# Patient Record
Sex: FEMALE | Race: BLACK OR AFRICAN AMERICAN | Employment: FULL TIME | URBAN - METROPOLITAN AREA
[De-identification: names, ages, dates, MRNs, and addresses within clinical notes are randomized per-mention and may not be internally consistent; named-entity substitution may affect disease eponyms.]

---

## 2020-12-12 LAB — CREATININE, EXTERNAL: 0.92

## 2020-12-18 ENCOUNTER — TRANSCRIBE ORDER (OUTPATIENT)
Dept: SCHEDULING | Age: 40
End: 2020-12-18

## 2020-12-18 DIAGNOSIS — D34 THYROID ADENOMA: Primary | ICD-10-CM

## 2021-01-07 ENCOUNTER — TRANSCRIBE ORDER (OUTPATIENT)
Dept: SCHEDULING | Age: 41
End: 2021-01-07

## 2021-01-07 DIAGNOSIS — D34 THYROID ADENOMA: Primary | ICD-10-CM

## 2021-01-13 ENCOUNTER — HOSPITAL ENCOUNTER (OUTPATIENT)
Dept: CT IMAGING | Age: 41
Discharge: HOME | End: 2021-01-13
Attending: OTOLARYNGOLOGY
Payer: COMMERCIAL

## 2021-01-13 DIAGNOSIS — D34 THYROID ADENOMA: ICD-10-CM

## 2021-01-13 PROCEDURE — 70490 CT SOFT TISSUE NECK W/O DYE: CPT

## 2021-03-05 ENCOUNTER — VIRTUAL VISIT (OUTPATIENT)
Dept: ENDOCRINOLOGY | Age: 41
End: 2021-03-05
Payer: COMMERCIAL

## 2021-03-05 DIAGNOSIS — E05.00 GRAVES' DISEASE WITH EXOPHTHALMOS: ICD-10-CM

## 2021-03-05 DIAGNOSIS — E89.0 POSTOPERATIVE HYPOTHYROIDISM: ICD-10-CM

## 2021-03-05 DIAGNOSIS — E05.90 HYPERTHYROIDISM: Primary | ICD-10-CM

## 2021-03-05 PROCEDURE — 99245 OFF/OP CONSLTJ NEW/EST HI 55: CPT | Performed by: INTERNAL MEDICINE

## 2021-03-05 RX ORDER — METHIMAZOLE 5 MG/1
TABLET ORAL
COMMUNITY
Start: 2020-12-30 | End: 2021-05-15

## 2021-03-05 NOTE — PROGRESS NOTES
Chief Complaint   Patient presents with    Thyroid Problem     pcp and pharmacy. DOXY. ME            **THIS IS A VIRTUAL VISIT VIA A VIDEO ENCOUNTER. PATIENT AGREED TO HAVE THEIR CARE DELIVERED OVER VIDEO IN PLACE OF THEIR REGULARLY SCHEDULED OFFICE VISIT**      History of Present Illness: Tessa Fermin is a 41 y.o. female who I was asked to see in consult by Dr. Demetrice Carrera for evaluation of hyperthyroidism. Pt was first diagnosed with hyperthyroidism around 2011. She notes that she has been on the [address] [place] [address] since that time. \"I was diagnosed by an endocrinologist, I don't recall who that was. I then started to go to my PCP who has been evaluating my TFTs and adjusting my MMI. Pt notes that I went to see an allergist and they referred her to Dr. Judy Cerda, who send her for a CT which showed the goiter and the orbitopathy. Her most recent TSH was 6.02 in December 2020 with FT4 of 0.92. She is currently taking MMI 10mg Mon and Wed and 5mg T/T/F/S/S. She has been on this dose for a couple of month. She notes her dose was reduced after the December TFTs. Pt notes she is having issues of dysphagia \"from time to time\". She denies issues of dysphagia, dysphonia, tremors, palpitations. She denies issues of heat or cold intolerance, diarrhea or constipation. \"When things get bad, I get fatigue and very tired\". She denies issues of eye pain, eye irritation or vision changes. She does report that \"I will feel eye pressure\". She will be having total thyroidectomy on 3/19/21 by Dr. Demetrice Carrera. She saw ophthalmology last week. She is seeing Dr. Heidi Phillips will be monitoring my pressure after the thyroidectomy\". Will request records from Dr. Adeola Chavira. No known family hx of thyroid issues. Pt reports that her weight is currently 70.3kg (155 pounds).     Past Medical History:   Diagnosis Date    Graves disease      Past Surgical History:   Procedure Laterality Date    HX TUBAL LIGATION      HX WISDOM TEETH EXTRACTION       Current Outpatient Medications   Medication Sig   • methIMAzole (TAPAZOLE) 5 mg tablet TAKE 2 TABLETS BY MOUTH DAILY ON MON/WED, THEN 1 TABLET DAILY ON TUES/THURS/FRI/SAT/SUN   • cholecalciferol, vitamin D3, (VITAMIN D3 PO) Take 5,000 Units by mouth daily.   • ZINC PO Take 50 mg by mouth daily.     No current facility-administered medications for this visit.      No Known Allergies  Family History   Problem Relation Age of Onset   • No Known Problems Mother    • No Known Problems Father    • No Known Problems Maternal Grandmother    • No Known Problems Maternal Grandfather         mva   • No Known Problems Paternal Grandmother    • Cancer Paternal Grandfather         ? type (colon or prostate)   • Breast Cancer Maternal Aunt    • Cancer Maternal Aunt         skin   • No Known Problems Half-sister    • No Known Problems Half-sister    • No Known Problems Half-sister    • No Known Problems Half-sister    • No Known Problems Half-sister    • No Known Problems Half-brother      Social History     Socioeconomic History   • Marital status:      Spouse name: Not on file   • Number of children: Not on file   • Years of education: Not on file   • Highest education level: Not on file   Occupational History   • Not on file   Social Needs   • Financial resource strain: Not on file   • Food insecurity     Worry: Not on file     Inability: Not on file   • Transportation needs     Medical: Not on file     Non-medical: Not on file   Tobacco Use   • Smoking status: Former Smoker   • Smokeless tobacco: Former User   • Tobacco comment: Recently quit vapping. (February 2021)   Substance and Sexual Activity   • Alcohol use: Yes     Comment: occasionally   • Drug use: Never   • Sexual activity: Not on file   Lifestyle   • Physical activity     Days per week: Not on file     Minutes per session: Not on file   • Stress: Not on file   Relationships   • Social connections     Talks on phone: Not on file     Gets  together: Not on file     Attends [employer] service: Not on file     Active member of club or organization: Not on file     Attends meetings of clubs or organizations: Not on file     Relationship status: Not on file    Intimate partner violence     Fear of current or ex partner: Not on file     Emotionally abused: Not on file     Physically abused: Not on file     Forced sexual activity: Not on file   Other Topics Concern    Not on file   Social History Narrative    Not on file     Review of Systems:  - Constitutional Symptoms: no fevers, chills, weight loss  - Eyes: no blurry vision or double vision  - Cardiovascular: no chest pain or palpitations  - Respiratory: no cough or shortness of breath  - Gastrointestinal: no dysphagia or abdominal pain  - Musculoskeletal: no joint pains or weakness  - Integumentary: no rashes  - Neurological: no numbness, tingling, or headaches  - Psychiatric: no depression or anxiety  - Endocrine: no heat or cold intolerance, no polyuria or polydipsia    Physical Examination:  There were no vitals taken for this visit. - GENERAL: NCAT, Appears well nourished   - EYES: EOMI, non-icteric, + proptosis   - Ear/Nose/Throat: NCAT, + goiter   - CARDIOVASCULAR: no cyanosis, no visible JVD   - RESPIRATORY: respiratory effort normal without any distress or labored breathing   - MUSCULOSKELETAL: Normal ROM of neck and upper extremities observed   - SKIN: No rash on face   - NEUROLOGIC:  No facial asymmetry (Cranial nerve 7 motor function), No gaze palsy   - PSYCHIATRIC: Normal affect, Normal insight and judgement     Data Reviewed:   - TSH 6.02  - Free T4 0.92    EXAM:  CT NECK SOFT TISSUE WO CONT     INDICATION:  Thyroid adenoma.     COMPARISON: None.     CONTRAST: None.     TECHNIQUE: Multislice helical CT was performed from the mid calvarium to the  aortic arch without intravenous contrast administration.  Contiguous 2.5 mm axial  images were reconstructed and lung and soft tissue windows were generated. Coronal and sagittal reformations were generated. CT dose reduction was  achieved through use of a standardized protocol tailored for this examination  and automatic exposure control for dose modulation.      FINDINGS:  There is diffuse thyromegaly, with the right thyroid lobe measuring  approximately 7.4 x 3.4 x 3.0 cm, and the left thyroid lobe measuring  approximately 6.8 x 3.4 x 3.0 cm. No discrete thyroid nodule is identified on  this noncontrast examination. There is minimal narrowing of the trachea in  transverse dimension. There are no enlarged cervical lymph nodes. The  aerodigestive tract is unremarkable. The parotid and submandibular glands are  normal.     There is bilateral proptosis with abundant intraorbital fat and stretching of  the optic nerves. There is mild enlargement of the bilateral inferior and medial  rectus muscles, left larger than right.     Visualized brain parenchyma is normal in appearance. Paranasal sinuses and  mastoid air cells are clear. No acute fracture or aggressive osseous lesion. Visualized portions of the lung apices are normal.     IMPRESSION  IMPRESSION:   1. Diffuse thyromegaly, most suggestive of Graves' disease. No discrete thyroid  nodule is identified on this noncontrast examination. Minimal narrowing of the  trachea in transverse dimension. 2. Bilateral proptosis with abundant intraorbital fat and stretching of the  optic nerves. Mild enlargement of the bilateral inferior and medial rectus  muscles, left larger than right. 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 Assessment/Plan:   1. Hyperthyroidism    2. Graves' disease with exophthalmos    3. Postoperative hypothyroidism    1) Pt is going for total thyroidectomy on 3/19/21. Her current weight is 70kg. I recommend that after surgery she be started on LT4 112mcg daily.    We discussed how the Grave's autoantibodies effect the thyroid gland causing both hyperthyroidism and thyromegaly. We also discussed the grave's opthalmopathy and how thyroidectomy can potentially lower the grave's antibody burden by removing the thyroid and there by removing the immuno-stimulation. Will see pt back 6 weeks after surgery and will order TFTs to address her, then post-op hypothyroidism. We discussed that the surgery does not stop the production of the Grave's antibodies so that she will continue to have \"grave's disease with post-surgical hypothyroidism\"    Pt voices understanding and agreement with the plan. RTC 4/28/21      Copy sent to:  Marly Yung and Vince Lazo

## 2021-03-08 ENCOUNTER — HOSPITAL ENCOUNTER (OUTPATIENT)
Dept: PREADMISSION TESTING | Age: 41
Discharge: HOME | End: 2021-03-08
Payer: COMMERCIAL

## 2021-03-08 VITALS
HEIGHT: 60 IN | BODY MASS INDEX: 31.16 KG/M2 | WEIGHT: 158.73 LBS | DIASTOLIC BLOOD PRESSURE: 84 MMHG | SYSTOLIC BLOOD PRESSURE: 124 MMHG | TEMPERATURE: 98.9 F | HEART RATE: 78 BPM

## 2021-03-08 LAB
ALBUMIN SERPL-MCNC: 3.8 G/DL (ref 3.5–5)
ALBUMIN/GLOB SERPL: 1.1 {RATIO} (ref 1.1–2.2)
ALP SERPL-CCNC: 72 U/L (ref 45–117)
ALT SERPL-CCNC: 16 U/L (ref 12–78)
ANION GAP SERPL CALC-SCNC: 4 MMOL/L (ref 5–15)
APTT PPP: 28.5 SEC (ref 22.1–31)
AST SERPL-CCNC: 9 U/L (ref 15–37)
ATRIAL RATE: 65 BPM
BASOPHILS # BLD: 0 K/UL (ref 0–0.1)
BASOPHILS NFR BLD: 0 % (ref 0–1)
BILIRUB SERPL-MCNC: 0.3 MG/DL (ref 0.2–1)
BUN SERPL-MCNC: 14 MG/DL (ref 6–20)
BUN/CREAT SERPL: 18 (ref 12–20)
CALCIUM SERPL-MCNC: 8.6 MG/DL (ref 8.5–10.1)
CALCULATED P AXIS, ECG09: 40 DEGREES
CALCULATED R AXIS, ECG10: 25 DEGREES
CALCULATED T AXIS, ECG11: 16 DEGREES
CHLORIDE SERPL-SCNC: 106 MMOL/L (ref 97–108)
CO2 SERPL-SCNC: 28 MMOL/L (ref 21–32)
CREAT SERPL-MCNC: 0.78 MG/DL (ref 0.55–1.02)
DIAGNOSIS, 93000: NORMAL
DIFFERENTIAL METHOD BLD: NORMAL
EOSINOPHIL # BLD: 0.1 K/UL (ref 0–0.4)
EOSINOPHIL NFR BLD: 2 % (ref 0–7)
ERYTHROCYTE [DISTWIDTH] IN BLOOD BY AUTOMATED COUNT: 13.2 % (ref 11.5–14.5)
GLOBULIN SER CALC-MCNC: 3.4 G/DL (ref 2–4)
GLUCOSE SERPL-MCNC: 83 MG/DL (ref 65–100)
HCT VFR BLD AUTO: 36.4 % (ref 35–47)
HGB BLD-MCNC: 11.9 G/DL (ref 11.5–16)
IMM GRANULOCYTES # BLD AUTO: 0 K/UL (ref 0–0.04)
IMM GRANULOCYTES NFR BLD AUTO: 0 % (ref 0–0.5)
INR PPP: 1.1 (ref 0.9–1.1)
LYMPHOCYTES # BLD: 1.5 K/UL (ref 0.8–3.5)
LYMPHOCYTES NFR BLD: 31 % (ref 12–49)
MCH RBC QN AUTO: 30.7 PG (ref 26–34)
MCHC RBC AUTO-ENTMCNC: 32.7 G/DL (ref 30–36.5)
MCV RBC AUTO: 93.8 FL (ref 80–99)
MONOCYTES # BLD: 0.4 K/UL (ref 0–1)
MONOCYTES NFR BLD: 8 % (ref 5–13)
NEUTS SEG # BLD: 2.9 K/UL (ref 1.8–8)
NEUTS SEG NFR BLD: 59 % (ref 32–75)
NRBC # BLD: 0 K/UL (ref 0–0.01)
NRBC BLD-RTO: 0 PER 100 WBC
P-R INTERVAL, ECG05: 138 MS
PLATELET # BLD AUTO: 328 K/UL (ref 150–400)
PMV BLD AUTO: 9.5 FL (ref 8.9–12.9)
POTASSIUM SERPL-SCNC: 3.9 MMOL/L (ref 3.5–5.1)
PROT SERPL-MCNC: 7.2 G/DL (ref 6.4–8.2)
PROTHROMBIN TIME: 11.3 SEC (ref 9–11.1)
Q-T INTERVAL, ECG07: 358 MS
QRS DURATION, ECG06: 76 MS
QTC CALCULATION (BEZET), ECG08: 372 MS
RBC # BLD AUTO: 3.88 M/UL (ref 3.8–5.2)
SODIUM SERPL-SCNC: 138 MMOL/L (ref 136–145)
T4 FREE SERPL-MCNC: 0.7 NG/DL (ref 0.8–1.5)
THERAPEUTIC RANGE,PTTT: NORMAL SECS (ref 58–77)
TSH SERPL DL<=0.05 MIU/L-ACNC: 3.5 UIU/ML (ref 0.36–3.74)
VENTRICULAR RATE, ECG03: 65 BPM
WBC # BLD AUTO: 4.9 K/UL (ref 3.6–11)

## 2021-03-08 PROCEDURE — 85730 THROMBOPLASTIN TIME PARTIAL: CPT

## 2021-03-08 PROCEDURE — 93005 ELECTROCARDIOGRAM TRACING: CPT

## 2021-03-08 PROCEDURE — 85025 COMPLETE CBC W/AUTO DIFF WBC: CPT

## 2021-03-08 PROCEDURE — 80053 COMPREHEN METABOLIC PANEL: CPT

## 2021-03-08 PROCEDURE — 36415 COLL VENOUS BLD VENIPUNCTURE: CPT

## 2021-03-08 PROCEDURE — 85610 PROTHROMBIN TIME: CPT

## 2021-03-08 PROCEDURE — 84439 ASSAY OF FREE THYROXINE: CPT

## 2021-03-08 PROCEDURE — 84443 ASSAY THYROID STIM HORMONE: CPT

## 2021-03-08 NOTE — PERIOP NOTES
PREOPERATIVE INSTRUCTIONS REVIEWED WITH PATIENT. PATIENT GIVEN TWO-- BOTTLES OF CHG SOAPS  INSTRUCTIONS REVIEWED ON USE OF CHG SOAPS   PATIENT GIVEN SSI INFECTIONS SHEET. PATIENT WAS GIVEN THE OPPORTUNITY TO ASK QUESTIONS ON THE INFORMATION PROVIDED. PT  MADE AWARE OF COVID 19 TESTING NEEDED TO BE DONE WITHIN 96 HOURS OF SURGERY. PT INSTRUCTED ON SELF QUARANTINE  BETWEEN TESTING AND ARRIVAL TIME  ON DAY OF SURGERY. INSTRUCTION PROVIDED FOR CELL PHONE WAITING AREA, PT INFORMATION AND INSTRUCTIONS FOR APPOINTMENTS AT [facility] ON DAY OF SURGERY.

## 2021-03-09 ENCOUNTER — ANESTHESIA EVENT (OUTPATIENT)
Dept: SURGERY | Age: 41
End: 2021-03-09
Payer: COMMERCIAL

## 2021-03-09 NOTE — PERIOP NOTES
EUGENIO IN DR. Yury Trujillo OFFICE NOTIFIED OF THE FOLLOWING:    PT-11.3 (H)    PCP FAXED RESULT VIA [facility]. ALL LABS AND EKG FAXED TO DR. Yury Trujillo OFFICE WITH CONFIRMATION RECEIPT RECEIVED.

## 2021-03-19 ENCOUNTER — ANESTHESIA (OUTPATIENT)
Dept: SURGERY | Age: 41
End: 2021-03-19
Payer: COMMERCIAL

## 2021-04-16 ENCOUNTER — VIRTUAL VISIT (OUTPATIENT)
Dept: ONCOLOGY | Age: 41
End: 2021-04-16
Payer: COMMERCIAL

## 2021-04-16 DIAGNOSIS — E03.9 HYPOTHYROIDISM, UNSPECIFIED TYPE: ICD-10-CM

## 2021-04-16 DIAGNOSIS — R58 BLEEDING: Primary | ICD-10-CM

## 2021-04-16 DIAGNOSIS — I10 ESSENTIAL HYPERTENSION: ICD-10-CM

## 2021-04-16 PROCEDURE — 99204 OFFICE O/P NEW MOD 45 MIN: CPT | Performed by: INTERNAL MEDICINE

## 2021-04-16 NOTE — PROGRESS NOTES
[facility] [place] at [address] [place] [address] [facility]  W: [phone]  F: [phone]    Reason for Visit:   Nimisha Lynne is a 41 y.o. female who is seen in consultation at the request of Dr. Erica Gray  for evaluation of bleeding disorder    History of Present Illness:   Patient is a 41 y.o. female seen for possible bleeding diathesis. She is to have a thyroidectomy with Dr. Erica Gray, was noted to have an PT of 11.3 in her pre op testing. She has had 2 normal pregnancies, she has had wisdom tooth extraction without bleeding,  She has had no fevers, chills, sweats, no FH of bleeding.  No liver disease    She rarely uses alcohol    Past Medical History:   Diagnosis Date    Goiter     Graves disease       Past Surgical History:   Procedure Laterality Date    HX HEENT      WISDOM TEETH EXTRACTION     HX TUBAL LIGATION      HX WISDOM TEETH EXTRACTION        Social History     Tobacco Use    Smoking status: Former Smoker     Packs/day: 0.50     Years: 10.00     Pack years: 5.00     Quit date:      Years since quittin.2    Smokeless tobacco: Former User   Substance Use Topics    Alcohol use: Yes     Comment: occasionally      Family History   Problem Relation Age of Onset    No Known Problems Mother     No Known Problems Father     No Known Problems Maternal Grandmother     No Known Problems Maternal Grandfather         mva    No Known Problems Paternal Grandmother     Cancer Paternal Grandfather         ? type (colon or prostate)    Breast Cancer Maternal Aunt     Cancer Maternal Aunt         skin    No Known Problems Half-sister     No Known Problems Half-sister     No Known Problems Half-sister     No Known Problems Half-sister     No Known Problems Half-sister     No Known Problems Half-brother     Anesth Problems Neg Hx      Current Outpatient Medications   Medication Sig    methIMAzole (TAPAZOLE) 5 mg tablet TAKE 2 TABLETS BY MOUTH DAILY ON MON/WED, THEN 1 TABLET DAILY ON TUES/THURS/FRI/SAT/SUN    cholecalciferol, vitamin D3, (VITAMIN D3 PO) Take 5,000 Units by mouth daily.  ZINC PO Take 50 mg by mouth daily. No current facility-administered medications for this visit. Allergies   Allergen Reactions    Grass Pollen-Perennial Rye, Standard Hives    Shellfish Derived Hives        Review of Systems: A complete review of systems was obtained, negative except as described above. Physical Exam:     Due to this being a TeleHealth evaluation, many elements of the physical examination are unable to be assessed. Constitutional: Appears well-developed and well-nourished in no apparent distress   Mental status: Alert and awake, Oriented to person/place/time, Able to follow commands  Eyes: EOM normal, Sclera normal, No visible ocular discharge  HENT: Normocephalic, atraumatic; Mouth/Throat: Moist mucous membranes, External Ears normal  Skin: No significant exanthematous lesions or discoloration noted on facial skin  Psychiatric: Normal affect, normal judgment/insight. No hallucinations       Results:     Lab Results   Component Value Date/Time    WBC 4.9 03/08/2021 02:20 PM    HGB 11.9 03/08/2021 02:20 PM    HCT 36.4 03/08/2021 02:20 PM    PLATELET 252 85/61/4732 02:20 PM    MCV 93.8 03/08/2021 02:20 PM    ABS. NEUTROPHILS 2.9 03/08/2021 02:20 PM     Lab Results   Component Value Date/Time    Sodium 138 03/08/2021 02:20 PM    Potassium 3.9 03/08/2021 02:20 PM    Chloride 106 03/08/2021 02:20 PM    CO2 28 03/08/2021 02:20 PM    Glucose 83 03/08/2021 02:20 PM    BUN 14 03/08/2021 02:20 PM    Creatinine 0.78 03/08/2021 02:20 PM    GFR est AA >60 03/08/2021 02:20 PM    GFR est non-AA >60 03/08/2021 02:20 PM    Calcium 8.6 03/08/2021 02:20 PM     Lab Results   Component Value Date/Time    Bilirubin, total 0.3 03/08/2021 02:20 PM    ALT (SGPT) 16 03/08/2021 02:20 PM    Alk.  phosphatase 72 03/08/2021 02:20 PM    Protein, total 7.2 03/08/2021 02:20 PM    Albumin 3.8 03/08/2021 02:20 PM    Globulin 3.4 03/08/2021 02:20 PM       Lab Results   Component Value Date/Time    TSH 3.50 03/08/2021 02:20 PM     Lab Results   Component Value Date/Time    INR 1.1 03/08/2021 02:20 PM    aPTT 28.5 03/08/2021 02:20 PM       Records reviewed and summarized above. Pathology report(s) reviewed above. Radiology report(s) reviewed above. Assessment:   1) Abnormal PT    11.3  No H/O Bleeding / FH of bleeding  Meds are non contributory though methimazole may cause hypoprothrombinemia  Likely not clinically significant  See work up below    If work up unremarkable - Hold Methimazole for atleast 24-48 hours prior to surgery    2) Graves disease    3) Obesity      Plan:     · PT, PT Mixing study, VII activity  · Hold Methimazole 24-48 hours prior to surgery  · RTC 2 weeks VV to review results    I appreciate the opportunity to participate in Ms. Deo Arechiga's care. Signed By: Robert Clifford MD      The patient was evaluated through a synchronous (real-time) audio-video encounter. The patient (or guardian if applicable) is aware that this is a billable service. Verbal consent to proceed has been obtained within the past 12 months. The visit was conducted pursuant to the emergency declaration under the [address] [place] [address], [address] [place] [address] authority and the [employer] General Act. Patient identification was verified, and a caregiver was present when appropriate. The patient was located in a state where the provider was credentialed to provide care.

## 2021-04-16 NOTE — PROGRESS NOTES
Jaqueline Neely is a 41 y.o. female    No chief complaint on file. 1. Have you been to the ER, urgent care clinic since your last visit? Hospitalized since your last visit? No    2. Have you seen or consulted any other health care providers outside of the [address] [place] [address] since your last visit? Include any pap smears or colon screening.  No

## 2021-05-03 LAB
ALBUMIN SERPL-MCNC: 4.5 G/DL (ref 3.8–4.8)
ALBUMIN/GLOB SERPL: 1.8 {RATIO} (ref 1.2–2.2)
ALP SERPL-CCNC: 73 IU/L (ref 39–117)
ALT SERPL-CCNC: 6 IU/L (ref 0–32)
AST SERPL-CCNC: 12 IU/L (ref 0–40)
BILIRUB SERPL-MCNC: 0.3 MG/DL (ref 0–1.2)
BUN SERPL-MCNC: 9 MG/DL (ref 6–24)
BUN/CREAT SERPL: 12 (ref 9–23)
CALCIUM SERPL-MCNC: 9.2 MG/DL (ref 8.7–10.2)
CHLORIDE SERPL-SCNC: 104 MMOL/L (ref 96–106)
CO2 SERPL-SCNC: 22 MMOL/L (ref 20–29)
CREAT SERPL-MCNC: 0.73 MG/DL (ref 0.57–1)
FACT VII ACT/NOR PPP: 75 % (ref 51–186)
GLOBULIN SER CALC-MCNC: 2.5 G/DL (ref 1.5–4.5)
GLUCOSE SERPL-MCNC: 92 MG/DL (ref 65–99)
POTASSIUM SERPL-SCNC: 4.3 MMOL/L (ref 3.5–5.2)
PROT SERPL-MCNC: 7 G/DL (ref 6–8.5)
PROTHROMBIN TIME: 10.6 SEC (ref 9.1–12)
PT 1H NP PPP: NORMAL SEC
PT IMM NP PPP: 10.2 SEC (ref 9.1–12)
SODIUM SERPL-SCNC: 139 MMOL/L (ref 134–144)

## 2021-05-06 ENCOUNTER — VIRTUAL VISIT (OUTPATIENT)
Dept: ONCOLOGY | Age: 41
End: 2021-05-06
Payer: COMMERCIAL

## 2021-05-06 DIAGNOSIS — R58 BLEEDING: Primary | ICD-10-CM

## 2021-05-06 PROCEDURE — 99213 OFFICE O/P EST LOW 20 MIN: CPT | Performed by: INTERNAL MEDICINE

## 2021-05-06 RX ORDER — POTASSIUM IODIDE 1 G/ML
0.1 SOLUTION ORAL 3 TIMES DAILY
COMMUNITY
Start: 2021-03-23 | End: 2021-05-15

## 2021-05-06 NOTE — PROGRESS NOTES
[facility] [place] at [place] [address] [address]  W: [phone]  F: [phone]    Reason for Visit:   Bhanu Hansen is a 35 y.o. female who is seen as a follow up to r/o a bleeding disorder    Seen by synchronous (real-time) audio-video technology on 2021     History of Present Illness:   Patient is a 35 y.o. female seen for possible bleeding diathesis. She is to have a thyroidectomy with Dr. Taye Davis, was noted to have an PT of 11.3 in her pre op testing. She has had 2 normal pregnancies, she has had wisdom tooth extraction without bleeding,  She has had no fevers, chills, sweats, no FH of bleeding.  No liver disease  Seen for review of additional testing  She rarely uses alcohol    Past Medical History:   Diagnosis Date    Goiter     Graves disease       Past Surgical History:   Procedure Laterality Date    HX HEENT      WISDOM TEETH EXTRACTION     HX TUBAL LIGATION      HX WISDOM TEETH EXTRACTION        Social History     Tobacco Use    Smoking status: Former Smoker     Packs/day: 0.50     Years: 10.00     Pack years: 5.00     Quit date:      Years since quittin.3    Smokeless tobacco: Former User   Substance Use Topics    Alcohol use: Yes     Comment: occasionally      Family History   Problem Relation Age of Onset    No Known Problems Mother     No Known Problems Father     No Known Problems Maternal Grandmother     No Known Problems Maternal Grandfather         mva    No Known Problems Paternal Grandmother     Cancer Paternal Grandfather         ? type (colon or prostate)    Breast Cancer Maternal Aunt     Cancer Maternal Aunt         skin    No Known Problems Half-sister     No Known Problems Half-sister     No Known Problems Half-sister     No Known Problems Half-sister     No Known Problems Half-sister     No Known Problems Half-brother     Anesth Problems Neg Hx      Current Outpatient Medications   Medication Sig  SSKI 1 gram/mL solution Take 0.1 mL by mouth three (3) times daily.  methIMAzole (TAPAZOLE) 5 mg tablet TAKE 2 TABLETS BY MOUTH DAILY ON MON/WED, THEN 1 TABLET DAILY ON TUES/THURS/FRI/SAT/SUN    cholecalciferol, vitamin D3, (VITAMIN D3 PO) Take 5,000 Units by mouth daily.  ZINC PO Take 50 mg by mouth daily. No current facility-administered medications for this visit. Allergies   Allergen Reactions    Grass Pollen-Perennial Rye, Standard Hives    Shellfish Derived Hives        Review of Systems: A complete review of systems was obtained, negative except as described above. Physical Exam:     Due to this being a TeleHealth evaluation, many elements of the physical examination are unable to be assessed. Constitutional: Appears well-developed and well-nourished in no apparent distress   Mental status: Alert and awake, Oriented to person/place/time, Able to follow commands  Psychiatric: Normal affect, normal judgment/insight. No hallucinations       Results:     Lab Results   Component Value Date/Time    WBC 4.9 03/08/2021 02:20 PM    HGB 11.9 03/08/2021 02:20 PM    HCT 36.4 03/08/2021 02:20 PM    PLATELET 277 97/48/4805 02:20 PM    MCV 93.8 03/08/2021 02:20 PM    ABS. NEUTROPHILS 2.9 03/08/2021 02:20 PM     Lab Results   Component Value Date/Time    Sodium 139 05/01/2021 08:03 AM    Potassium 4.3 05/01/2021 08:03 AM    Chloride 104 05/01/2021 08:03 AM    CO2 22 05/01/2021 08:03 AM    Glucose 92 05/01/2021 08:03 AM    BUN 9 05/01/2021 08:03 AM    Creatinine 0.73 05/01/2021 08:03 AM    GFR est  05/01/2021 08:03 AM    GFR est non- 05/01/2021 08:03 AM    Calcium 9.2 05/01/2021 08:03 AM     Lab Results   Component Value Date/Time    Bilirubin, total 0.3 05/01/2021 08:03 AM    ALT (SGPT) 6 05/01/2021 08:03 AM    Alk.  phosphatase 73 05/01/2021 08:03 AM    Protein, total 7.0 05/01/2021 08:03 AM    Albumin 4.5 05/01/2021 08:03 AM    Globulin 3.4 03/08/2021 02:20 PM       Lab Results Component Value Date/Time    TSH 3.50 03/08/2021 02:20 PM     Lab Results   Component Value Date/Time    INR 1.1 03/08/2021 02:20 PM    aPTT 28.5 03/08/2021 02:20 PM     Component      Latest Ref Rng & Units 5/1/2021           8:03 AM   PT      9.1 - 12.0 sec 10.6   PT 1:1NP      9.1 - 12.0 sec 10.2   1 Hr Incub PT 1:1NP      sec CANCELED       Factor VII normal     Records reviewed and summarized above. Pathology report(s) reviewed above. Radiology report(s) reviewed above. Assessment:   1) Abnormal PT    11.3  No H/O Bleeding / FH of bleeding  Meds are non contributory though methimazole may cause hypoprothrombinemia  Likely not clinically significant    Repeat PT is normal 10.6  She has normal factor VII and does not have a bleeding diathesis    Consider  Holding Methimazole for atleast 24-48 hours prior to surgery if ok with endocrinology    2) Graves disease    3) Obesity      Plan:     · No bleeding diathesis  · Consider Holding Methimazole 24-48 hours prior to surgery if ok with endocrinology  No follow up  Results reviewed and discussed with Dr. Isabel Reyez via EMR  I appreciate the opportunity to participate in Ms. Silvia Arechiga's care. Signed By: Daryl Martinez MD      The patient was evaluated through a synchronous (real-time) audio-video encounter. The patient (or guardian if applicable) is aware that this is a billable service. Verbal consent to proceed has been obtained within the past 12 months. The visit was conducted pursuant to the emergency declaration under the [address] [place] [address], [address] [place] [address] authority and the [employer] General Act. Patient identification was verified, and a caregiver was present when appropriate. The patient was located in a state where the provider was credentialed to provide care.

## 2021-05-07 NOTE — PERIOP NOTES
COVID TESTING APPT MADE FOR 5/7/21 AT 1500. PT HAD LAB WORK WITH HEMATOLOGIST LAST WEEK AND F/U APPT WITH DR. Aguila Luna 5/6/21. PT STATES SHE WAS TOLD THAT WAS ALL SHE NEEDED TO HAVE DONE BEFORE SURGERY-NO FURTHER PAT PER PT.  DR. TAO'S NOTE IS IN CC. PATIENT CALLED AND MADE AWARE OF COVID-19 TESTING NEEDED TO BE DONE WITHIN 96 HOURS OF SURGERY. COVID-19 TESTING APPOINTMENT MADE FOR PATIENT. PATIENT INSTRUCTED ON SELF QUARANTINE BETWEEN TESTING AND ARRIVAL TIME DAY OF SURGERY.

## 2021-05-10 ENCOUNTER — HOSPITAL ENCOUNTER (OUTPATIENT)
Dept: PREADMISSION TESTING | Age: 41
Discharge: HOME | End: 2021-05-10
Payer: COMMERCIAL

## 2021-05-10 ENCOUNTER — TRANSCRIBE ORDER (OUTPATIENT)
Dept: REGISTRATION | Age: 41
End: 2021-05-10

## 2021-05-10 DIAGNOSIS — Z01.812 PRE-PROCEDURE LAB EXAM: Primary | ICD-10-CM

## 2021-05-10 DIAGNOSIS — Z01.812 PRE-PROCEDURE LAB EXAM: ICD-10-CM

## 2021-05-10 PROCEDURE — U0003 INFECTIOUS AGENT DETECTION BY NUCLEIC ACID (DNA OR RNA); SEVERE ACUTE RESPIRATORY SYNDROME CORONAVIRUS 2 (SARS-COV-2) (CORONAVIRUS DISEASE [COVID-19]), AMPLIFIED PROBE TECHNIQUE, MAKING USE OF HIGH THROUGHPUT TECHNOLOGIES AS DESCRIBED BY CMS-2020-01-R: HCPCS

## 2021-05-11 LAB — SARS-COV-2, COV2NT: NOT DETECTED

## 2021-05-13 ENCOUNTER — HOSPITAL ENCOUNTER (OUTPATIENT)
Dept: PREADMISSION TESTING | Age: 41
Discharge: HOME | End: 2021-05-13
Payer: COMMERCIAL

## 2021-05-13 LAB
ALBUMIN SERPL-MCNC: 4.1 G/DL (ref 3.5–5)
ALBUMIN/GLOB SERPL: 1.2 {RATIO} (ref 1.1–2.2)
ALP SERPL-CCNC: 76 U/L (ref 45–117)
ALT SERPL-CCNC: 16 U/L (ref 12–78)
ANION GAP SERPL CALC-SCNC: 3 MMOL/L (ref 5–15)
APTT PPP: 29.3 SEC (ref 22.1–31)
AST SERPL-CCNC: 7 U/L (ref 15–37)
BASOPHILS # BLD: 0 K/UL (ref 0–0.1)
BASOPHILS NFR BLD: 0 % (ref 0–1)
BILIRUB SERPL-MCNC: 0.4 MG/DL (ref 0.2–1)
BUN SERPL-MCNC: 7 MG/DL (ref 6–20)
BUN/CREAT SERPL: 9 (ref 12–20)
CALCIUM SERPL-MCNC: 9.1 MG/DL (ref 8.5–10.1)
CHLORIDE SERPL-SCNC: 105 MMOL/L (ref 97–108)
CO2 SERPL-SCNC: 30 MMOL/L (ref 21–32)
CREAT SERPL-MCNC: 0.76 MG/DL (ref 0.55–1.02)
DIFFERENTIAL METHOD BLD: NORMAL
EOSINOPHIL # BLD: 0.1 K/UL (ref 0–0.4)
EOSINOPHIL NFR BLD: 2 % (ref 0–7)
ERYTHROCYTE [DISTWIDTH] IN BLOOD BY AUTOMATED COUNT: 12.8 % (ref 11.5–14.5)
GLOBULIN SER CALC-MCNC: 3.5 G/DL (ref 2–4)
GLUCOSE SERPL-MCNC: 92 MG/DL (ref 65–100)
HCT VFR BLD AUTO: 39.7 % (ref 35–47)
HGB BLD-MCNC: 13 G/DL (ref 11.5–16)
IMM GRANULOCYTES # BLD AUTO: 0 K/UL (ref 0–0.04)
IMM GRANULOCYTES NFR BLD AUTO: 0 % (ref 0–0.5)
INR PPP: 1.1 (ref 0.9–1.1)
LYMPHOCYTES # BLD: 1.3 K/UL (ref 0.8–3.5)
LYMPHOCYTES NFR BLD: 28 % (ref 12–49)
MCH RBC QN AUTO: 30.7 PG (ref 26–34)
MCHC RBC AUTO-ENTMCNC: 32.7 G/DL (ref 30–36.5)
MCV RBC AUTO: 93.6 FL (ref 80–99)
MONOCYTES # BLD: 0.3 K/UL (ref 0–1)
MONOCYTES NFR BLD: 5 % (ref 5–13)
NEUTS SEG # BLD: 3.1 K/UL (ref 1.8–8)
NEUTS SEG NFR BLD: 65 % (ref 32–75)
NRBC # BLD: 0 K/UL (ref 0–0.01)
NRBC BLD-RTO: 0 PER 100 WBC
PLATELET # BLD AUTO: 365 K/UL (ref 150–400)
PMV BLD AUTO: 9.8 FL (ref 8.9–12.9)
POTASSIUM SERPL-SCNC: 3.7 MMOL/L (ref 3.5–5.1)
PROT SERPL-MCNC: 7.6 G/DL (ref 6.4–8.2)
PROTHROMBIN TIME: 11.2 SEC (ref 9–11.1)
RBC # BLD AUTO: 4.24 M/UL (ref 3.8–5.2)
SODIUM SERPL-SCNC: 138 MMOL/L (ref 136–145)
THERAPEUTIC RANGE,PTTT: NORMAL SECS (ref 58–77)
TSH SERPL DL<=0.05 MIU/L-ACNC: 4.65 UIU/ML (ref 0.36–3.74)
WBC # BLD AUTO: 4.8 K/UL (ref 3.6–11)

## 2021-05-13 PROCEDURE — 85730 THROMBOPLASTIN TIME PARTIAL: CPT

## 2021-05-13 PROCEDURE — 85025 COMPLETE CBC W/AUTO DIFF WBC: CPT

## 2021-05-13 PROCEDURE — 85610 PROTHROMBIN TIME: CPT

## 2021-05-13 PROCEDURE — 80053 COMPREHEN METABOLIC PANEL: CPT

## 2021-05-13 PROCEDURE — 36415 COLL VENOUS BLD VENIPUNCTURE: CPT

## 2021-05-13 PROCEDURE — 84443 ASSAY THYROID STIM HORMONE: CPT

## 2021-05-14 ENCOUNTER — HOSPITAL ENCOUNTER (OUTPATIENT)
Age: 41
Setting detail: OBSERVATION
Discharge: HOME | End: 2021-05-15
Attending: OTOLARYNGOLOGY | Admitting: OTOLARYNGOLOGY
Payer: COMMERCIAL

## 2021-05-14 DIAGNOSIS — E04.2 MULTINODULAR GOITER: Primary | ICD-10-CM

## 2021-05-14 LAB
CALCIUM SERPL-MCNC: 8.8 MG/DL (ref 8.5–10.1)
HCG UR QL: NEGATIVE
MAGNESIUM SERPL-MCNC: 1.9 MG/DL (ref 1.6–2.4)
PHOSPHATE SERPL-MCNC: 2.9 MG/DL (ref 2.6–4.7)

## 2021-05-14 PROCEDURE — 74011250636 HC RX REV CODE- 250/636: Performed by: OTOLARYNGOLOGY

## 2021-05-14 PROCEDURE — 83735 ASSAY OF MAGNESIUM: CPT

## 2021-05-14 PROCEDURE — 2709999900 HC NON-CHARGEABLE SUPPLY: Performed by: OTOLARYNGOLOGY

## 2021-05-14 PROCEDURE — 77030020268 HC MISC GENERAL SUPPLY: Performed by: OTOLARYNGOLOGY

## 2021-05-14 PROCEDURE — 77030010512 HC APPL CLP LIG J&J -C: Performed by: OTOLARYNGOLOGY

## 2021-05-14 PROCEDURE — 77030040922 HC BLNKT HYPOTHRM STRY -A

## 2021-05-14 PROCEDURE — 74011250637 HC RX REV CODE- 250/637: Performed by: OTOLARYNGOLOGY

## 2021-05-14 PROCEDURE — 99218 HC RM OBSERVATION: CPT

## 2021-05-14 PROCEDURE — 74011250636 HC RX REV CODE- 250/636: Performed by: ANESTHESIOLOGY

## 2021-05-14 PROCEDURE — 74011000250 HC RX REV CODE- 250: Performed by: NURSE ANESTHETIST, CERTIFIED REGISTERED

## 2021-05-14 PROCEDURE — 77030031753 HC SHR ENDO COAG HARM J&J -E: Performed by: OTOLARYNGOLOGY

## 2021-05-14 PROCEDURE — 74011250636 HC RX REV CODE- 250/636: Performed by: NURSE ANESTHETIST, CERTIFIED REGISTERED

## 2021-05-14 PROCEDURE — 77030002916 HC SUT ETHLN J&J -A: Performed by: OTOLARYNGOLOGY

## 2021-05-14 PROCEDURE — 82310 ASSAY OF CALCIUM: CPT

## 2021-05-14 PROCEDURE — 74011000250 HC RX REV CODE- 250: Performed by: OTOLARYNGOLOGY

## 2021-05-14 PROCEDURE — 77030026438 HC STYL ET INTUB CARD -A: Performed by: ANESTHESIOLOGY

## 2021-05-14 PROCEDURE — 77030031139 HC SUT VCRL2 J&J -A: Performed by: OTOLARYNGOLOGY

## 2021-05-14 PROCEDURE — 88307 TISSUE EXAM BY PATHOLOGIST: CPT

## 2021-05-14 PROCEDURE — 76060000064 HC AMB SURG ANES 2 TO 2.5 HR: Performed by: OTOLARYNGOLOGY

## 2021-05-14 PROCEDURE — 81025 URINE PREGNANCY TEST: CPT

## 2021-05-14 PROCEDURE — 77030027714 HC DRN WND KT TLS STRY -B: Performed by: OTOLARYNGOLOGY

## 2021-05-14 PROCEDURE — 77030021052 HC RNG RETRCTR STAY COOP -A: Performed by: OTOLARYNGOLOGY

## 2021-05-14 PROCEDURE — 76030000004 HC AMB SURG OR TIME 2 TO 2.5: Performed by: OTOLARYNGOLOGY

## 2021-05-14 PROCEDURE — 77030011267 HC ELECTRD BLD COVD -A: Performed by: OTOLARYNGOLOGY

## 2021-05-14 PROCEDURE — 77030014008 HC SPNG HEMSTAT J&J -C: Performed by: OTOLARYNGOLOGY

## 2021-05-14 PROCEDURE — 77030002933 HC SUT MCRYL J&J -A: Performed by: OTOLARYNGOLOGY

## 2021-05-14 PROCEDURE — 77030019655 HC PRB STIM CRAN MEDT -B: Performed by: OTOLARYNGOLOGY

## 2021-05-14 PROCEDURE — 76210000037 HC AMBSU PH I REC 2 TO 2.5 HR: Performed by: OTOLARYNGOLOGY

## 2021-05-14 PROCEDURE — 77030040356 HC CORD BPLR FRCP COVD -A: Performed by: OTOLARYNGOLOGY

## 2021-05-14 PROCEDURE — 77030002996 HC SUT SLK J&J -A: Performed by: OTOLARYNGOLOGY

## 2021-05-14 PROCEDURE — 36415 COLL VENOUS BLD VENIPUNCTURE: CPT

## 2021-05-14 PROCEDURE — 84100 ASSAY OF PHOSPHORUS: CPT

## 2021-05-14 PROCEDURE — 77030008698 HC TU ET REINF MEDT -D: Performed by: ANESTHESIOLOGY

## 2021-05-14 RX ORDER — SODIUM CHLORIDE 0.9 % (FLUSH) 0.9 %
5-40 SYRINGE (ML) INJECTION AS NEEDED
Status: DISCONTINUED | OUTPATIENT
Start: 2021-05-14 | End: 2021-05-14 | Stop reason: HOSPADM

## 2021-05-14 RX ORDER — ACETAMINOPHEN 325 MG/1
650 TABLET ORAL ONCE
Status: DISCONTINUED | OUTPATIENT
Start: 2021-05-14 | End: 2021-05-14 | Stop reason: HOSPADM

## 2021-05-14 RX ORDER — DEXAMETHASONE SODIUM PHOSPHATE 4 MG/ML
INJECTION, SOLUTION INTRA-ARTICULAR; INTRALESIONAL; INTRAMUSCULAR; INTRAVENOUS; SOFT TISSUE AS NEEDED
Status: DISCONTINUED | OUTPATIENT
Start: 2021-05-14 | End: 2021-05-14 | Stop reason: HOSPADM

## 2021-05-14 RX ORDER — PROPOFOL 10 MG/ML
INJECTION, EMULSION INTRAVENOUS AS NEEDED
Status: DISCONTINUED | OUTPATIENT
Start: 2021-05-14 | End: 2021-05-14 | Stop reason: HOSPADM

## 2021-05-14 RX ORDER — ONDANSETRON 2 MG/ML
4 INJECTION INTRAMUSCULAR; INTRAVENOUS AS NEEDED
Status: DISCONTINUED | OUTPATIENT
Start: 2021-05-14 | End: 2021-05-14 | Stop reason: HOSPADM

## 2021-05-14 RX ORDER — FENTANYL CITRATE 50 UG/ML
INJECTION, SOLUTION INTRAMUSCULAR; INTRAVENOUS AS NEEDED
Status: DISCONTINUED | OUTPATIENT
Start: 2021-05-14 | End: 2021-05-14 | Stop reason: HOSPADM

## 2021-05-14 RX ORDER — FENTANYL CITRATE 50 UG/ML
25 INJECTION, SOLUTION INTRAMUSCULAR; INTRAVENOUS
Status: DISCONTINUED | OUTPATIENT
Start: 2021-05-14 | End: 2021-05-14 | Stop reason: HOSPADM

## 2021-05-14 RX ORDER — DIPHENHYDRAMINE HYDROCHLORIDE 50 MG/ML
12.5 INJECTION, SOLUTION INTRAMUSCULAR; INTRAVENOUS AS NEEDED
Status: DISCONTINUED | OUTPATIENT
Start: 2021-05-14 | End: 2021-05-14 | Stop reason: HOSPADM

## 2021-05-14 RX ORDER — SODIUM CHLORIDE 0.9 % (FLUSH) 0.9 %
5-40 SYRINGE (ML) INJECTION AS NEEDED
Status: DISCONTINUED | OUTPATIENT
Start: 2021-05-14 | End: 2021-05-15 | Stop reason: HOSPADM

## 2021-05-14 RX ORDER — MIDAZOLAM HYDROCHLORIDE 1 MG/ML
INJECTION, SOLUTION INTRAMUSCULAR; INTRAVENOUS AS NEEDED
Status: DISCONTINUED | OUTPATIENT
Start: 2021-05-14 | End: 2021-05-14 | Stop reason: HOSPADM

## 2021-05-14 RX ORDER — DEXTROSE, SODIUM CHLORIDE, AND POTASSIUM CHLORIDE 5; .45; .15 G/100ML; G/100ML; G/100ML
25 INJECTION INTRAVENOUS CONTINUOUS
Status: DISCONTINUED | OUTPATIENT
Start: 2021-05-14 | End: 2021-05-15 | Stop reason: HOSPADM

## 2021-05-14 RX ORDER — SODIUM CHLORIDE 9 MG/ML
25 INJECTION, SOLUTION INTRAVENOUS CONTINUOUS
Status: DISCONTINUED | OUTPATIENT
Start: 2021-05-14 | End: 2021-05-14 | Stop reason: HOSPADM

## 2021-05-14 RX ORDER — FERROUS SULFATE, DRIED 160(50) MG
1 TABLET, EXTENDED RELEASE ORAL EVERY 8 HOURS
Status: DISCONTINUED | OUTPATIENT
Start: 2021-05-14 | End: 2021-05-15 | Stop reason: HOSPADM

## 2021-05-14 RX ORDER — LIDOCAINE HYDROCHLORIDE AND EPINEPHRINE 10; 10 MG/ML; UG/ML
INJECTION, SOLUTION INFILTRATION; PERINEURAL AS NEEDED
Status: DISCONTINUED | OUTPATIENT
Start: 2021-05-14 | End: 2021-05-14 | Stop reason: HOSPADM

## 2021-05-14 RX ORDER — ONDANSETRON 2 MG/ML
INJECTION INTRAMUSCULAR; INTRAVENOUS AS NEEDED
Status: DISCONTINUED | OUTPATIENT
Start: 2021-05-14 | End: 2021-05-14 | Stop reason: HOSPADM

## 2021-05-14 RX ORDER — LIDOCAINE HYDROCHLORIDE 20 MG/ML
INJECTION, SOLUTION EPIDURAL; INFILTRATION; INTRACAUDAL; PERINEURAL AS NEEDED
Status: DISCONTINUED | OUTPATIENT
Start: 2021-05-14 | End: 2021-05-14 | Stop reason: HOSPADM

## 2021-05-14 RX ORDER — SODIUM CHLORIDE 0.9 % (FLUSH) 0.9 %
5-40 SYRINGE (ML) INJECTION EVERY 8 HOURS
Status: DISCONTINUED | OUTPATIENT
Start: 2021-05-14 | End: 2021-05-14 | Stop reason: HOSPADM

## 2021-05-14 RX ORDER — OXYCODONE AND ACETAMINOPHEN 10; 325 MG/1; MG/1
1 TABLET ORAL
Status: DISCONTINUED | OUTPATIENT
Start: 2021-05-14 | End: 2021-05-15 | Stop reason: HOSPADM

## 2021-05-14 RX ORDER — SODIUM CHLORIDE, SODIUM LACTATE, POTASSIUM CHLORIDE, CALCIUM CHLORIDE 600; 310; 30; 20 MG/100ML; MG/100ML; MG/100ML; MG/100ML
100 INJECTION, SOLUTION INTRAVENOUS CONTINUOUS
Status: DISCONTINUED | OUTPATIENT
Start: 2021-05-14 | End: 2021-05-14 | Stop reason: HOSPADM

## 2021-05-14 RX ORDER — MIDAZOLAM HYDROCHLORIDE 1 MG/ML
1 INJECTION, SOLUTION INTRAMUSCULAR; INTRAVENOUS AS NEEDED
Status: DISCONTINUED | OUTPATIENT
Start: 2021-05-14 | End: 2021-05-14 | Stop reason: HOSPADM

## 2021-05-14 RX ORDER — SUCCINYLCHOLINE CHLORIDE 20 MG/ML
INJECTION INTRAMUSCULAR; INTRAVENOUS AS NEEDED
Status: DISCONTINUED | OUTPATIENT
Start: 2021-05-14 | End: 2021-05-14 | Stop reason: HOSPADM

## 2021-05-14 RX ORDER — OXYCODONE AND ACETAMINOPHEN 5; 325 MG/1; MG/1
1 TABLET ORAL
Status: DISCONTINUED | OUTPATIENT
Start: 2021-05-14 | End: 2021-05-15 | Stop reason: HOSPADM

## 2021-05-14 RX ORDER — SODIUM CHLORIDE, SODIUM LACTATE, POTASSIUM CHLORIDE, CALCIUM CHLORIDE 600; 310; 30; 20 MG/100ML; MG/100ML; MG/100ML; MG/100ML
INJECTION, SOLUTION INTRAVENOUS
Status: DISCONTINUED | OUTPATIENT
Start: 2021-05-14 | End: 2021-05-14 | Stop reason: HOSPADM

## 2021-05-14 RX ORDER — ROPIVACAINE HYDROCHLORIDE 5 MG/ML
30 INJECTION, SOLUTION EPIDURAL; INFILTRATION; PERINEURAL AS NEEDED
Status: DISCONTINUED | OUTPATIENT
Start: 2021-05-14 | End: 2021-05-14 | Stop reason: HOSPADM

## 2021-05-14 RX ORDER — FENTANYL CITRATE 50 UG/ML
50 INJECTION, SOLUTION INTRAMUSCULAR; INTRAVENOUS AS NEEDED
Status: DISCONTINUED | OUTPATIENT
Start: 2021-05-14 | End: 2021-05-14 | Stop reason: HOSPADM

## 2021-05-14 RX ORDER — SODIUM CHLORIDE 0.9 % (FLUSH) 0.9 %
5-40 SYRINGE (ML) INJECTION EVERY 8 HOURS
Status: DISCONTINUED | OUTPATIENT
Start: 2021-05-14 | End: 2021-05-15 | Stop reason: HOSPADM

## 2021-05-14 RX ORDER — ROCURONIUM BROMIDE 10 MG/ML
INJECTION, SOLUTION INTRAVENOUS AS NEEDED
Status: DISCONTINUED | OUTPATIENT
Start: 2021-05-14 | End: 2021-05-14 | Stop reason: HOSPADM

## 2021-05-14 RX ORDER — DEXMEDETOMIDINE HYDROCHLORIDE 100 UG/ML
INJECTION, SOLUTION INTRAVENOUS AS NEEDED
Status: DISCONTINUED | OUTPATIENT
Start: 2021-05-14 | End: 2021-05-14 | Stop reason: HOSPADM

## 2021-05-14 RX ORDER — LIDOCAINE HYDROCHLORIDE 10 MG/ML
0.1 INJECTION, SOLUTION EPIDURAL; INFILTRATION; INTRACAUDAL; PERINEURAL AS NEEDED
Status: DISCONTINUED | OUTPATIENT
Start: 2021-05-14 | End: 2021-05-14 | Stop reason: HOSPADM

## 2021-05-14 RX ORDER — MORPHINE SULFATE 2 MG/ML
2 INJECTION, SOLUTION INTRAMUSCULAR; INTRAVENOUS
Status: DISCONTINUED | OUTPATIENT
Start: 2021-05-14 | End: 2021-05-14 | Stop reason: HOSPADM

## 2021-05-14 RX ORDER — MIDAZOLAM HYDROCHLORIDE 1 MG/ML
0.5 INJECTION, SOLUTION INTRAMUSCULAR; INTRAVENOUS
Status: DISCONTINUED | OUTPATIENT
Start: 2021-05-14 | End: 2021-05-14 | Stop reason: HOSPADM

## 2021-05-14 RX ORDER — ONDANSETRON 2 MG/ML
4 INJECTION INTRAMUSCULAR; INTRAVENOUS
Status: DISCONTINUED | OUTPATIENT
Start: 2021-05-14 | End: 2021-05-15 | Stop reason: HOSPADM

## 2021-05-14 RX ORDER — PHENYLEPHRINE HCL IN 0.9% NACL 0.4MG/10ML
SYRINGE (ML) INTRAVENOUS AS NEEDED
Status: DISCONTINUED | OUTPATIENT
Start: 2021-05-14 | End: 2021-05-14 | Stop reason: HOSPADM

## 2021-05-14 RX ORDER — ACETAMINOPHEN 325 MG/1
650 TABLET ORAL
Status: DISCONTINUED | OUTPATIENT
Start: 2021-05-14 | End: 2021-05-15 | Stop reason: HOSPADM

## 2021-05-14 RX ORDER — KETAMINE HYDROCHLORIDE 10 MG/ML
INJECTION, SOLUTION INTRAMUSCULAR; INTRAVENOUS AS NEEDED
Status: DISCONTINUED | OUTPATIENT
Start: 2021-05-14 | End: 2021-05-14 | Stop reason: HOSPADM

## 2021-05-14 RX ORDER — HYDROMORPHONE HYDROCHLORIDE 1 MG/ML
0.5 INJECTION, SOLUTION INTRAMUSCULAR; INTRAVENOUS; SUBCUTANEOUS
Status: DISCONTINUED | OUTPATIENT
Start: 2021-05-14 | End: 2021-05-14 | Stop reason: HOSPADM

## 2021-05-14 RX ADMIN — SUCCINYLCHOLINE CHLORIDE 140 MG: 20 INJECTION, SOLUTION INTRAMUSCULAR; INTRAVENOUS at 10:03

## 2021-05-14 RX ADMIN — Medication 1 TABLET: at 16:12

## 2021-05-14 RX ADMIN — Medication 1 TABLET: at 22:33

## 2021-05-14 RX ADMIN — LIDOCAINE HYDROCHLORIDE 100 MG: 20 INJECTION, SOLUTION EPIDURAL; INFILTRATION; INTRACAUDAL; PERINEURAL at 10:03

## 2021-05-14 RX ADMIN — FENTANYL CITRATE 25 MCG: 50 INJECTION, SOLUTION INTRAMUSCULAR; INTRAVENOUS at 13:37

## 2021-05-14 RX ADMIN — ACETAMINOPHEN 650 MG: 325 TABLET ORAL at 16:12

## 2021-05-14 RX ADMIN — Medication 100 MCG: at 10:44

## 2021-05-14 RX ADMIN — FENTANYL CITRATE 25 MCG: 50 INJECTION, SOLUTION INTRAMUSCULAR; INTRAVENOUS at 14:12

## 2021-05-14 RX ADMIN — WATER 2 G: 1 INJECTION INTRAMUSCULAR; INTRAVENOUS; SUBCUTANEOUS at 10:15

## 2021-05-14 RX ADMIN — MIDAZOLAM 2 MG: 1 INJECTION INTRAMUSCULAR; INTRAVENOUS at 09:55

## 2021-05-14 RX ADMIN — SODIUM CHLORIDE, POTASSIUM CHLORIDE, SODIUM LACTATE AND CALCIUM CHLORIDE: 600; 310; 30; 20 INJECTION, SOLUTION INTRAVENOUS at 09:12

## 2021-05-14 RX ADMIN — DEXMEDETOMIDINE HYDROCHLORIDE 10 MCG: 100 INJECTION, SOLUTION, CONCENTRATE INTRAVENOUS at 10:27

## 2021-05-14 RX ADMIN — PROPOFOL 200 MG: 10 INJECTION, EMULSION INTRAVENOUS at 10:03

## 2021-05-14 RX ADMIN — DEXAMETHASONE SODIUM PHOSPHATE 10 MG: 4 INJECTION, SOLUTION INTRAMUSCULAR; INTRAVENOUS at 10:03

## 2021-05-14 RX ADMIN — PROPOFOL 40 MG: 10 INJECTION, EMULSION INTRAVENOUS at 11:26

## 2021-05-14 RX ADMIN — ONDANSETRON HYDROCHLORIDE 4 MG: 2 INJECTION, SOLUTION INTRAMUSCULAR; INTRAVENOUS at 11:41

## 2021-05-14 RX ADMIN — Medication 80 MCG: at 10:24

## 2021-05-14 RX ADMIN — FENTANYL CITRATE 100 MCG: 50 INJECTION, SOLUTION INTRAMUSCULAR; INTRAVENOUS at 10:03

## 2021-05-14 RX ADMIN — OXYCODONE HYDROCHLORIDE AND ACETAMINOPHEN 1 TABLET: 5; 325 TABLET ORAL at 22:33

## 2021-05-14 RX ADMIN — DEXMEDETOMIDINE HYDROCHLORIDE 10 MCG: 100 INJECTION, SOLUTION, CONCENTRATE INTRAVENOUS at 10:03

## 2021-05-14 RX ADMIN — POTASSIUM CHLORIDE, DEXTROSE MONOHYDRATE AND SODIUM CHLORIDE 25 ML/HR: 150; 5; 450 INJECTION, SOLUTION INTRAVENOUS at 16:12

## 2021-05-14 RX ADMIN — KETAMINE HYDROCHLORIDE 30 MG: 10 INJECTION, SOLUTION INTRAMUSCULAR; INTRAVENOUS at 10:35

## 2021-05-14 RX ADMIN — PROPOFOL 100 MG: 10 INJECTION, EMULSION INTRAVENOUS at 10:35

## 2021-05-14 RX ADMIN — ROCURONIUM BROMIDE 10 MG: 10 SOLUTION INTRAVENOUS at 10:03

## 2021-05-14 RX ADMIN — Medication 100 MCG: at 10:30

## 2021-05-14 RX ADMIN — KETAMINE HYDROCHLORIDE 20 MG: 10 INJECTION, SOLUTION INTRAMUSCULAR; INTRAVENOUS at 10:19

## 2021-05-14 NOTE — OP NOTES
Date of Procedure: 14 May 2021    Pre-operative Diagnosis: Symptomatic non-toxic diffuse Goiter, Graves Disease  Post-operative Diagnosis: Symptomatic non-toxic diffuse Goiter, Graves Disease  Procedure(s):   Neck exploration   Total Thyroidectomy  Laryngeal nerve monitoring  Surgeon(s) and Role:   * Lilly Pickens MD  Co-surgeon  * Juliet Spicer MD, JEFFREY FACS  Co-surgeon   Anesthesia: General endotracheal anesthesia (GETA)  Urine output: Not documented  Estimated Blood Loss: 75 ml   IVF: 700 ml crystalloid             Drains: None  Patient in room at 0959 hours  Antibiotic prophylaxis: Ancef 2 g at 1015 hours  Preliminary time out at 1024 hours  Beta blocker not administered prior to operation (not indicated)  Prayer at 1030 hours  Time out for surgery at 1030 hours    (During the time out for surgery the correct patient, operative site and procedure were confirmed, along with having the necessary equipment on hand to perform the operation safely)  Start of surgery at 1031 hours   End of surgery at 1149 hours   VTE prophylaxis with bilateral lower extremity compression devices   Pressure points padded   Sponge, sharp and instrument count: Correct  History: 43-year-old female with Graves Disease, Graves Ophthalmopathy, and Symptomatic Goiter; currently on Methimazole. Symptoms include feeling of pressure on throat with difficulty lying supine due to throat discomfort and pressure, dysphagia, choking sensation. No history of head or neck radiation. No history of SALGADO treatment   No personal or family history of thyroid cancer. 1/13/2021  EXAM:  CT NECK SOFT TISSUE WO CONT  INDICATION:  Thyroid adenoma. COMPARISON: None. CONTRAST: None. TECHNIQUE: Multi-slice helical CT was performed from the mid calvarium to the aortic arch without intravenous contrast administration. Contiguous 2.5 mm axial images were reconstructed and lung and soft tissue windows were generated.  Coronal and sagittal reformations were generated. CT dose reduction was achieved through use of a standardized protocol tailored for this examination and automatic exposure control for dose modulation. FINDINGS: There is diffuse thyromegaly, with the right thyroid lobe measuring approximately 7.4 x 3.4 x 3.0 cm, and the left thyroid lobe measuring approximately 6.8 x 3.4 x 3.0 cm. No discrete thyroid nodule is identified on this noncontrast examination. There is minimal narrowing of the trachea in transverse dimension. There are no enlarged cervical lymph nodes. The aerodigestive tract is unremarkable. The parotid and submandibular glands are normal. There is bilateral proptosis with abundant intraorbital fat and stretching of the optic nerves. There is mild enlargement of the bilateral inferior and medial rectus muscles, left larger than right. Visualized brain parenchyma is normal in appearance. Paranasal sinuses and mastoid air cells are clear. No acute fracture or aggressive osseous lesion. Visualized portions of the lung apices are normal.    IMPRESSION:   1. Diffuse thyromegaly, most suggestive of Graves' disease. No discrete thyroid nodule is identified on this noncontrast examination. Minimal narrowing of the trachea in transverse dimension. 2. Bilateral proptosis with abundant intraorbital fat and stretching of the optic nerves. Mild enlargement of bilateral inferior and medial rectus muscles, left larger than right. 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 3/8/2021  TSH 3.5 uIU/mL  FT4 0.7 ng/dL     Procedure (s) performed:   Neck exploration   Total Thyroidectomy  Auto-transplantation of normal appearing 5x3 mm right inferior parathyroid gland into the distal anterior aspect of the right sternothyroid muscle  Laryngeal nerve monitoring  Specimens:    Total Thyroidectomy  single short stitch right superior thyroid pole; double stitch anterior isthmus  Findings:    The right thyroid lobe measures 7.4 x 3. 4 cm.  The left thyroid lobe measures 6.8 x 3.4 cm.  The thyroid isthmus measures 5 mm in thickness.  No palpable central or lateral neck adenopathy.  Two normal appearing parathyroid glands (right superior and right inferior) identified and preserved on a vascular pedicle.  Two normal appearing parathyroid glands (left superior and left inferior) identified and preserved on a vascular pedicle.  The right inferior parathyroid gland was identified beneath the thyroid capsule and confirmed with PTeye to be parathyroid gland. The right inferior parathyroid gland was normal in size and appearance and auto-transplanted into a 1 x 1 cm pocket within the distal anterior aspect of the right sternothyroid muscle; and the autotransplantation site was marked with two interrupted 2-0 prolene sutures   Both recurrent laryngeal nerves were identified and carefully preserved throughout the entire course of the operation    Both recurrent laryngeal nerves were confirmed to be structurally and functionally intact - audible signal attained with nerve stimulator / NIM system when applied to both the right and left recurrent laryngeal nerve    Excellent hemostasis confirmed at end of operation    Pre-op holding area:   Patient seen in pre-operative holding area   Operative site marked on anterior neck  Patient was informed of the indications, nature, risks, benefits, alternatives and expected outcomes of the proposed operation:   Neck Exploration, Laryngeal Neuromonitoring, Total Thyroidectomy, possible central neck dissection  The patient voiced understanding of the aforesaid and provided informed consent to proceed   The patient asked pertinent questions, all of which were answered to her apparent satisfaction   Operative Procedure: The patient was escorted to the operating room   Upon arrival to the operative room, the patient, procedure, and operative site were confirmed via a pre-operative time-out. During the time out for surgery the correct patient, operative site and procedure were confirmed, along with having the necessary equipment on hand to perform the operation safely. All in attendance agree. The patient was placed in the supine position and general anesthesia induced without incident. General endotracheal anesthesia was induced with endotracheal placement of NIM tube in order to facilitate laryngeal neuro-monitoring. The eyes were taped shut. Prophylactic antibiotic (ANCEF 2 g) was administered prior to the procedure. Beta blockade was not administered prior to operation (not indicated). With the patient in the supine position the arms were tucked, a pillow was placed behind the knees and the heels padded; the neck was slightly extended, and table elevated to 30 degrees. A roll was placed behind the scapulae to create mild degree of neck flexion  The occiput was placed on a cushion. Subcutaneous NIM monitoring system leads were placed. Pressure ulcer prophylaxis was in effect with pressure point padding. VTE prophylaxis was also in effect with lower extremity mechanical compression devices   Sterile anterior neck/sternum prep (Chlorhexidine - alcohol) and drape   We prayed for our patient and we repeated the TIME OUT prior to commencing the operation to confirm the    correct patient,    correct operative site,    correct operative procedure and    necessary equipment on hand to conduct the operation safely. Local anesthesia (50:50 mix of 1% LIDO w/ EPI  5 ml) infiltrated subcutaneously at site of proposed anterior cervical skin incision   Low anterior, transverse cervical incision was made, 8 cm in length, two finger breadths superior to the sternoclavicular joints, and parallel to the normal skin lines of the neck.   The skin, subcutaneous tissue and platysma muscle were divided   Sub-platysmal flaps were created by dissecting in the avascular plane superiorly to the thyroid cartilage and inferiorly to the suprasternal notch. Strap musculature (sternohyoid muscle and sternothyroid muscle) was  in the avascular midline (by incising the cervical linea alba), the incision extending from the suprasternal notch to the thyroid cartilage. The anterior jugular veins were ligated and divided. The anterior jugular veins empty into the jugular arch low in the neck anterior to the strap musculature. The jugular arch was ligated and divided in the space of Burns. A plane of dissection was developed between the posterior surface of the strap musculature (sternothyroid muscle) and the anterior surface of the thyroid gland. The right lateral thyroid recess was developed using gentle blunt dissection. The right thyroid lobe was gently lifted anteriorly. There was active inflammation of the thyroid gland with adherence to surrounding soft tissue necessitating meticulous dissection. There were no gross findings suspicious for malignancy. The right middle thyroid vein was isolated (no non-recurrent laryngeal nerve was observed). The right middle thyroid vein was controlled, ligated and divided with Harmonic scalpel on the right thyroid capsule. We then focused our attention on the right superior thyroid pole  The space was developed between the right cricothyroid muscle and medial aspect of right superior thyroid pole. Recognizing that variations exist between the course of the superior laryngeal nerves and the first branch of the external carotid artery (the superior thyroid artery), we took great care to dissect the superior thyroid artery and its branches prior to ligation. We were careful to keep the external branch of the superior laryngeal nerve (EBSLN, which provides motor fibers to the cricothyroid muscle and the inferior pharyngeal constrictor muscle) out of harms way. The right thyroid lobe was retracted in a caudal and lateral direction.   The right superior pole vessels (right superior thyroid artery and right superior thyroid vein) were skeletonized and meticulously controlled individually (with Harmonic scalpel division) on the thyroid capsule in order to avoid injury to the right superior laryngeal nerve's external branch. The right recurrent laryngeal nerve, which originates from the vagus nerve, loops around the right subclavian artery and ascends in the neck between the trachea and esophagus. The right recurrent laryngeal nerve was palpable and visually identified in the right trachea-esophageal (T-E) groove in proximity to the right inferior thyroid artery and right inferior thyroid vein. The right recurrent laryngeal nerves terminal branches provide motor innervation to all intrinsic muscles of the larynx except the cricothyroid muscle  innervated by EBSLN. The branches of the right inferior thyroid artery and branches of the right inferior thyroid vein were carefully dissected while maintaining the encountered right recurrent laryngeal nerve in view and out of harms way. The right inferior thyroid artery sends branches to both the superior and inferior parathyroid glands. The superior parathyroid gland (usually encountered at the Ligament of Berry lateral to the recurrent nerve) is more posterior in position relative to the more anteriorly situated inferior parathyroid gland (usually encountered anterior to the recurrent nerve, inferior to where the nerve crosses the inferior thyroid artery). The right inferior thyroid artery branches were controlled on the thyroid capsule after its identified parathyroid branches were given off. Branches of the right inferior thyroid vein were controlled on the thyroid capsule with Harmonic scalpel in order to avoid injury to the right inferior parathyroid gland that was identified anterior and medial to the right recurrent laryngeal nerve and beneath the anterior capsule of the right thyroid lobe.   The thyroid capsule overlying the right inferior parathyroid gland was incised and the right inferior parathyroid gland excised, confirmed with PTeye to be parathyroid tissue (baseline 171; right inferior parathyroid 4.0 times baseline), and preserved for autotransplantation. Both the inferior parathyroid gland and right recurrent laryngeal nerve were meticulously preserved intact and maintained out of harms way  The right inferior parathyroid gland was normal in size (5 x 3 mm) and gross appearance. Diligent search for the right superior parathyroid gland superior to the middle thyroid vein and posterior and lateral to the right recurrent laryngeal nerve was unrevealing. The right superior parathyroid gland was not identified. The right tubercle of Zukerkandl of the thyroid was identified and gently dissected free of both identified branches of the right recurrent laryngeal nerve. We recognize that this area where the right recurrent laryngeal nerve most closely approximates the thyroid gland and the terminal ascending branches of the inferior thyroid artery is where the recurrent nerve is at greatest risk of injury. The terminal branches of the right recurrent nerve may be non-recurrent (rarely), or may be anterior, posterior or in between the branches of the ascending right inferior thyroid artery. The Ligament of Thomas El was identified at the posterolateral portion of the thyroid lobe just caudal to the cricoid cartilage, and it was gently dissected in order to identify its relationship to the terminal aspects of the right recurrent laryngeal nerve, which was found to be passing posterior to the Ligament of Myles to gain entry into the larynx. The terminal branches of the right inferior thyroid artery were meticulously controlled with bi-polar cautery taking care to spare the adjacent right recurrent laryngeal nerve.   Both terminal right recurrent laryngeal nerve branches were identified and meticulously preserved, as they were seen passing under the inferior constrictor muscle, and through the cricothyroid membrane near the right cricothyroid joint, just superior to the cricoid cartilage, to enter the posterior and medial aspect of the larynx. The right thyroid lobe was further mobilized with care to the left. The attachments of the posterior right thyroid lobe and isthmus to the pre-tracheal fascia were divided with precision taking great care to avoid injury to the terminal branches of the right recurrent laryngeal nerve, which takes a more oblique course when compared to the more cephalo-caudad orientation of the recurrent laryngeal nerve on the left side of the neck, which also courses within the TE groove. The structural and functional integrity of the right recurrent laryngeal nerve was confirmed with the nerve stimulator ([employer] system), as a prominent, audible signal was attained with stimulation of the right recurrent nerve in its proximal, mid and distal cervical portions. Excellent hemostasis was confirmed. Attention was then directed to the left neck. The same surgical principles and maneuvers were applied to the contralateral neck to complete the total thyroidectomy, briefly:  The left lateral thyroid recess was developed posterior to the left sternothyroid muscle utilizing gentle blunt dissection. The left middle thyroid was identified, dissected, ligated and divided with the Harmonic scalpel. The space between the left cricothyroid muscle and medial aspect of the left superior thyroid pole was developed using gentle dissection. The left superior thyroid pole vessels (left superior thyroid artery and left superior thyroid vein) were meticulously controlled on the thyroid capsule with Harmonic scalpel ligation and division, taking great care to avoid injury to the left superior laryngeal nerve's external branch. The left thyroid lobe was gently mobilized anteromedially to the right. The left T-E groove was gently dissected. The left recurrent laryngeal nerve was encountered in proximity to the left inferior thyroid artery. The branches of the left inferior thyroid artery and left inferior thyroid vein were carefully controlled on the thyroid capsule in order to avoid injury and maintain blood supply to the identified, normal appearing left superior parathyroid gland (6 x 4 mm) and left inferior parathyroid gland (4 x 3 mm). Both left parathyroid glands were confirmed to be parathyroid tissue with PTeye (left superior parathyroid 1.4 and left inferior parathyroid 1.5 times baseline). The well perfused, normal appearing left superior parathyroid gland was situated cephalad of the left middle thyroid vein, and posterior and lateral to the encountered left recurrent laryngeal nerve. Dissection anterior and medial to the left recurrent laryngeal nerve and near the left inferior thyroid pole identified a well perfused, normal appearing left inferior parathyroid gland. Both parathyroid glands appeared viable/well perfused, and were carefully preserved on their respective vascular pedicles, intact throughout the entire course of our dissection. The superior and medial aspect of the left thyroid lobe was dissected free of both identified branches of the left recurrent laryngeal nerve. Both branches of the left recurrent laryngeal nerve were seen terminating near the left cricothyroid joint, and both recurrent laryngeal nerve branches were carefully preserved. The Tubercle of Zukerkandl of the left thyroid lobe was identified and gently dissected free of both identified branches of the left recurrent laryngeal nerve. The attachments of the posterior left thyroid lobe to the trachea were divided taking care not to injure the clearly visualized terminal branches of the left recurrent laryngeal nerve.    The total thyroidectomy specimen was delivered, oriented for pathological processing (single stitch right superior thyroid pole, double stitch anterior isthmus) and submitted for permanent pathology analysis. The structural and functional integrity of the left recurrent laryngeal nerve, and the right recurrent laryngeal nerve was confirmed with the nerve stimulator (&[employer]), as a prominent audible signal was attained when both branches of the left and right recurrent laryngeal nerves were stimulated. A diligent search of the central neck and both lateral areas of the neck was unrevealing; specifically, there was no palpable adenopathy in either the right or left lateral (Level II, III, IV) neck, or the central compartment (Level VI and VII). Excellent hemostasis was confirmed along with two viable appearing left parathyroid glands. The 5 x 3 mm right inferior parathyroid gland was auto-transplanted into a 1 x 1 cm pocket within the distal anterior aspect of the right sternothyroid muscle. The autotransplantation site was marked with two interrupted 2-0 prolene sutures. Excellent hemostasis in the operative field was re-confirmed under Valsalva maneuver. Surgicel was placed in the operative field as a hemostatic adjunct. A 10 TLS drain was placed percutaneously into the operative field and secure to the skin with prolene suture. The strap musculature (sternothyroid and sternohyoid muscles) was re-approximated in the midline with running 3-0 Vicryl suture. The platysma muscle was reconstituted with running absorbable (3-0 Vicryl) suture   The wound was irrigated with saline solution. The skin incision was closed with running absorbable subcuticular suture (4-0 Monocryl). The TLS drain was placed to closed tube suction. This was an uncomplicated operation. The patient was extubated uneventfully in the operating room and transferred to the PACU in stable condition with aspiration precautions in effect   Complications: None   Implants: None  Disposition:  To PACU extubated and in stable condition with aspiration precautions in effect   Sheryl John MD [facility]   Nhan Jaime MD

## 2021-05-14 NOTE — ROUTINE PROCESS
Patient: Nimisha Lynne MRN: [id number]  SSN: [id number] YOB: 1980  Age: 35 y.o. Sex: female Patient is status post Procedure(s): NECK EXPLORATION, TOTAL THYROIDECTOMY WITH NIM, AUTOTRANSPLANTATION RIGHT INFERIOR PARATHYROID GLAND TO RIGHT STERNOTHYROID MUSCLE. Surgeon(s) and Role: Chance Alonzo MD - Primary Rosemary Smith MD - Resident - Assisting Sumeet Barrientos MD - Co-Surgeon Local/Dose/Irrigation:  SEE MAR 
 
                   
TLS Drain 05/14/21 Neck (Active) Airway - Endotracheal Tube 05/14/21 Oral (Active) Dressing/Packing:  Incision 05/14/21 Neck-Dressing/Treatment: Steri-strips(MASTISOL) (05/14/21 1130) Splint/Cast:  ] Other:

## 2021-05-14 NOTE — PROGRESS NOTES
Bedside and Verbal shift change report given to ANDERSON Cervantes (oncoming nurse) by Anup Melendez RN (offgoing nurse). Report included the following information SBAR, Kardex, OR Summary, Procedure Summary, Intake/Output, MAR, Accordion and Recent Results.

## 2021-05-14 NOTE — ANESTHESIA PREPROCEDURE EVALUATION
Relevant Problems   No relevant active problems       Anesthetic History   No history of anesthetic complications            Review of Systems / Medical History  Patient summary reviewed, nursing notes reviewed and pertinent labs reviewed    Pulmonary  Within defined limits                 Neuro/Psych   Within defined limits           Cardiovascular  Within defined limits  Hypertension              Exercise tolerance: >4 METS     GI/Hepatic/Renal  Within defined limits              Endo/Other  Within defined limits    Hypothyroidism       Other Findings              Physical Exam    Airway  Mallampati: II  TM Distance: > 6 cm  Neck ROM: normal range of motion   Mouth opening: Normal     Cardiovascular  Regular rate and rhythm,  S1 and S2 normal,  no murmur, click, rub, or gallop             Dental  No notable dental hx       Pulmonary  Breath sounds clear to auscultation               Abdominal  GI exam deferred       Other Findings            Anesthetic Plan    ASA: 2  Anesthesia type: general          Induction: Intravenous  Anesthetic plan and risks discussed with: Patient

## 2021-05-14 NOTE — BRIEF OP NOTE
Brief Postoperative Note    Patient: Jerome Platt  YOB: 1980  MRN: [id number]    Date of Procedure: 5/14/2021     Pre-operative Diagnosis: Symptomatic non-toxic diffuse Goiter, Graves Disease  Post-operative Diagnosis: Symptomatic non-toxic diffuse Goiter, Graves Disease  Procedure(s):   Neck exploration   Total Thyroidectomy  Laryngeal nerve monitoring  Surgeon(s) and Role:   * Tamara Isaac MD  Co-surgeon  * Isra Kern MD, [facility]  Co-surgeon   Anesthesia: General endotracheal anesthesia (GETA)  Urine output: Not documented  Estimated Blood Loss: 75 ml   IVF: 700 ml crystalloid             Drains: None  Patient in room at 0959 hours  Antibiotic prophylaxis: Ancef 2 g at 1015 hours  Preliminary time out at 1024 hours  Beta blocker not administered prior to operation (not indicated)  Prayer at 1030 hours  Time out for surgery at 1030 hours    (During the time out for surgery the correct patient, operative site and procedure were confirmed, along with having the necessary equipment on hand to perform the operation safely)  Start of surgery at 1031 hours   End of surgery at 1149 hours   VTE prophylaxis with bilateral lower extremity compression devices   Pressure points padded   Sponge, sharp and instrument count: Correct  History: 24-year-old female with Graves Disease, Graves Ophthalmopathy, and Symptomatic Goiter; currently on Methimazole. Symptoms include feeling of pressure on throat with difficulty lying supine due to throat discomfort and pressure, dysphagia, choking sensation. No history of head or neck radiation. No history of SALGADO treatment   No personal or family history of thyroid cancer. 1/13/2021  EXAM:  CT NECK SOFT TISSUE WO CONT  INDICATION:  Thyroid adenoma. COMPARISON: None. CONTRAST: None. TECHNIQUE: Multi-slice helical CT was performed from the mid calvarium to the aortic arch without intravenous contrast administration.  Contiguous 2.5 mm axial images were reconstructed and lung and soft tissue windows were generated. Coronal and sagittal reformations were generated. CT dose reduction was achieved through use of a standardized protocol tailored for this examination and automatic exposure control for dose modulation. FINDINGS: There is diffuse thyromegaly, with the right thyroid lobe measuring approximately 7.4 x 3.4 x 3.0 cm, and the left thyroid lobe measuring approximately 6.8 x 3.4 x 3.0 cm. No discrete thyroid nodule is identified on this noncontrast examination. There is minimal narrowing of the trachea in transverse dimension. There are no enlarged cervical lymph nodes. The aerodigestive tract is unremarkable. The parotid and submandibular glands are normal. There is bilateral proptosis with abundant intraorbital fat and stretching of the optic nerves. There is mild enlargement of the bilateral inferior and medial rectus muscles, left larger than right. Visualized brain parenchyma is normal in appearance. Paranasal sinuses and mastoid air cells are clear. No acute fracture or aggressive osseous lesion. Visualized portions of the lung apices are normal.    IMPRESSION:   1. Diffuse thyromegaly, most suggestive of Graves' disease. No discrete thyroid nodule is identified on this noncontrast examination. Minimal narrowing of the trachea in transverse dimension. 2. Bilateral proptosis with abundant intraorbital fat and stretching of the optic nerves. Mild enlargement of bilateral inferior and medial rectus muscles, left larger than right. 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 3/8/2021  TSH 3.5 uIU/mL  FT4 0.7 ng/dL     Procedure (s) performed:   Neck exploration   Total Thyroidectomy  Auto-transplantation of normal appearing 5x3 mm right inferior parathyroid gland into the distal anterior aspect of the right sternothyroid muscle  Laryngeal nerve monitoring  Specimens:    Total Thyroidectomy  single short stitch right superior thyroid pole; double stitch anterior isthmus  Findings:    The right thyroid lobe measures 7.4 x 3.4 cm.  The left thyroid lobe measures 6.8 x 3.4 cm.  The thyroid isthmus measures 5 mm in thickness.  No palpable central or lateral neck adenopathy.  Two normal appearing parathyroid glands (right superior and right inferior) identified and preserved on a vascular pedicle.  Two normal appearing parathyroid glands (left superior and left inferior) identified and preserved on a vascular pedicle.  The right inferior parathyroid gland was identified beneath the thyroid capsule and confirmed with PTeye to be parathyroid gland. The right inferior parathyroid gland was normal in size and appearance and auto-transplanted into a 1 x 1 cm pocket within the distal anterior aspect of the right sternothyroid muscle; and the autotransplantation site was marked with two interrupted 2-0 prolene sutures   Both recurrent laryngeal nerves were identified and carefully preserved throughout the entire course of the operation    Both recurrent laryngeal nerves were confirmed to be structurally and functionally intact - audible signal attained with nerve stimulator / NIM system when applied to both the right and left recurrent laryngeal nerve   Excellent hemostasis confirmed at end of operation        Specimens:   ID Type Source Tests Collected by Time Destination   1 : TOTAL THYROIDECTOMY Fresh Thyroid  Kelly Mathews MD 5/14/2021 1044 Pathology        Drains:   TLS Drain 05/14/21 Neck (Active)   Site Assessment Clean, dry, & intact 05/14/21 1620   Dressing Status Clean, dry, & intact 05/14/21 1620   Status Patent;Draining 05/14/21 1620   Output (ml) 12 ml 05/14/21 1620       The patient was escorted to the operating room   Upon arrival to the operative room, the patient, procedure, and operative site were confirmed via a pre-operative time-out.    During the time out for surgery the correct patient, operative site and procedure were confirmed, along with having the necessary equipment on hand to perform the operation safely. All in attendance agree. The patient was placed in the supine position and general anesthesia induced without incident. General endotracheal anesthesia was induced with endotracheal placement of NIM tube in order to facilitate laryngeal neuro-monitoring. The eyes were taped shut. Prophylactic antibiotic (ANCEF 2 g) was administered prior to the procedure. Beta blockade was not administered prior to operation (not indicated). With the patient in the supine position the arms were tucked, a pillow was placed behind the knees and the heels padded; the neck was slightly extended, and table elevated to 30 degrees. A roll was placed behind the scapulae to create mild degree of neck flexion  The occiput was placed on a cushion. Subcutaneous NIM monitoring system leads were placed. Pressure ulcer prophylaxis was in effect with pressure point padding. VTE prophylaxis was also in effect with lower extremity mechanical compression devices   Sterile anterior neck/sternum prep (Chlorhexidine - alcohol) and drape   We prayed for our patient and we repeated the TIME OUT prior to commencing the operation to confirm the    correct patient,    correct operative site,    correct operative procedure and    necessary equipment on hand to conduct the operation safely. Local anesthesia (50:50 mix of 1% LIDO w/ EPI  5 ml) infiltrated subcutaneously at site of proposed anterior cervical skin incision   Low anterior, transverse cervical incision was made, 8 cm in length, two finger breadths superior to the sternoclavicular joints, and parallel to the normal skin lines of the neck. The skin, subcutaneous tissue and platysma muscle were divided   Sub-platysmal flaps were created by dissecting in the avascular plane superiorly to the thyroid cartilage and inferiorly to the suprasternal notch.    Strap musculature (sternohyoid muscle and sternothyroid muscle) was  in the avascular midline (by incising the cervical linea alba), the incision extending from the suprasternal notch to the thyroid cartilage. The anterior jugular veins were ligated and divided. The anterior jugular veins empty into the jugular arch low in the neck anterior to the strap musculature. The jugular arch was ligated and divided in the space of Burns. A plane of dissection was developed between the posterior surface of the strap musculature (sternothyroid muscle) and the anterior surface of the thyroid gland. The right lateral thyroid recess was developed using gentle blunt dissection. The right thyroid lobe was gently lifted anteriorly. There was active inflammation of the thyroid gland with adherence to surrounding soft tissue necessitating meticulous dissection. There were no gross findings suspicious for malignancy. The right middle thyroid vein was isolated (no non-recurrent laryngeal nerve was observed). The right middle thyroid vein was controlled, ligated and divided with Harmonic scalpel on the right thyroid capsule. We then focused our attention on the right superior thyroid pole  The space was developed between the right cricothyroid muscle and medial aspect of right superior thyroid pole. Recognizing that variations exist between the course of the superior laryngeal nerves and the first branch of the external carotid artery (the superior thyroid artery), we took great care to dissect the superior thyroid artery and its branches prior to ligation. We were careful to keep the external branch of the superior laryngeal nerve (EBSLN, which provides motor fibers to the cricothyroid muscle and the inferior pharyngeal constrictor muscle) out of harms way. The right thyroid lobe was retracted in a caudal and lateral direction.   The right superior pole vessels (right superior thyroid artery and right superior thyroid vein) were skeletonized and meticulously controlled individually (with Harmonic scalpel division) on the thyroid capsule in order to avoid injury to the right superior laryngeal nerve's external branch. The right recurrent laryngeal nerve, which originates from the vagus nerve, loops around the right subclavian artery and ascends in the neck between the trachea and esophagus. The right recurrent laryngeal nerve was palpable and visually identified in the right trachea-esophageal (T-E) groove in proximity to the right inferior thyroid artery and right inferior thyroid vein. The right recurrent laryngeal nerves terminal branches provide motor innervation to all intrinsic muscles of the larynx except the cricothyroid muscle  innervated by EBSLN. The branches of the right inferior thyroid artery and branches of the right inferior thyroid vein were carefully dissected while maintaining the encountered right recurrent laryngeal nerve in view and out of harms way. The right inferior thyroid artery sends branches to both the superior and inferior parathyroid glands. The superior parathyroid gland (usually encountered at the Ligament of Berry lateral to the recurrent nerve) is more posterior in position relative to the more anteriorly situated inferior parathyroid gland (usually encountered anterior to the recurrent nerve, inferior to where the nerve crosses the inferior thyroid artery). The right inferior thyroid artery branches were controlled on the thyroid capsule after its identified parathyroid branches were given off. Branches of the right inferior thyroid vein were controlled on the thyroid capsule with Harmonic scalpel in order to avoid injury to the right inferior parathyroid gland that was identified anterior and medial to the right recurrent laryngeal nerve and beneath the anterior capsule of the right thyroid lobe.   The thyroid capsule overlying the right inferior parathyroid gland was incised and the right inferior parathyroid gland excised, confirmed with PTeye to be parathyroid tissue (baseline 171; right inferior parathyroid 4.0 times baseline), and preserved for autotransplantation. Both the inferior parathyroid gland and right recurrent laryngeal nerve were meticulously preserved intact and maintained out of harms way  The right inferior parathyroid gland was normal in size (5 x 3 mm) and gross appearance. Diligent search for the right superior parathyroid gland superior to the middle thyroid vein and posterior and lateral to the right recurrent laryngeal nerve was unrevealing. The right superior parathyroid gland was not identified. The right tubercle of Zukerkandl of the thyroid was identified and gently dissected free of both identified branches of the right recurrent laryngeal nerve. We recognize that this area where the right recurrent laryngeal nerve most closely approximates the thyroid gland and the terminal ascending branches of the inferior thyroid artery is where the recurrent nerve is at greatest risk of injury. The terminal branches of the right recurrent nerve may be non-recurrent (rarely), or may be anterior, posterior or in between the branches of the ascending right inferior thyroid artery. The Ligament of Dora Holster was identified at the posterolateral portion of the thyroid lobe just caudal to the cricoid cartilage, and it was gently dissected in order to identify its relationship to the terminal aspects of the right recurrent laryngeal nerve, which was found to be passing posterior to the Ligament of Myles to gain entry into the larynx. The terminal branches of the right inferior thyroid artery were meticulously controlled with bi-polar cautery taking care to spare the adjacent right recurrent laryngeal nerve.   Both terminal right recurrent laryngeal nerve branches were identified and meticulously preserved, as they were seen passing under the inferior constrictor muscle, and through the cricothyroid membrane near the right cricothyroid joint, just superior to the cricoid cartilage, to enter the posterior and medial aspect of the larynx. The right thyroid lobe was further mobilized with care to the left. The attachments of the posterior right thyroid lobe and isthmus to the pre-tracheal fascia were divided with precision taking great care to avoid injury to the terminal branches of the right recurrent laryngeal nerve, which takes a more oblique course when compared to the more cephalo-caudad orientation of the recurrent laryngeal nerve on the left side of the neck, which also courses within the TE groove. The structural and functional integrity of the right recurrent laryngeal nerve was confirmed with the nerve stimulator ([employer] system), as a prominent, audible signal was attained with stimulation of the right recurrent nerve in its proximal, mid and distal cervical portions. Excellent hemostasis was confirmed. Attention was then directed to the left neck. The same surgical principles and maneuvers were applied to the contralateral neck to complete the total thyroidectomy, briefly:  The left lateral thyroid recess was developed posterior to the left sternothyroid muscle utilizing gentle blunt dissection. The left middle thyroid was identified, dissected, ligated and divided with the Harmonic scalpel. The space between the left cricothyroid muscle and medial aspect of the left superior thyroid pole was developed using gentle dissection. The left superior thyroid pole vessels (left superior thyroid artery and left superior thyroid vein) were meticulously controlled on the thyroid capsule with Harmonic scalpel ligation and division, taking great care to avoid injury to the left superior laryngeal nerve's external branch. The left thyroid lobe was gently mobilized anteromedially to the right. The left T-E groove was gently dissected.    The left recurrent laryngeal nerve was encountered in proximity to the left inferior thyroid artery. The branches of the left inferior thyroid artery and left inferior thyroid vein were carefully controlled on the thyroid capsule in order to avoid injury and maintain blood supply to the identified, normal appearing left superior parathyroid gland (6 x 4 mm) and left inferior parathyroid gland (4 x 3 mm). Both left parathyroid glands were confirmed to be parathyroid tissue with PTeye (left superior parathyroid 1.4 and left inferior parathyroid 1.5 times baseline). The well perfused, normal appearing left superior parathyroid gland was situated cephalad of the left middle thyroid vein, and posterior and lateral to the encountered left recurrent laryngeal nerve. Dissection anterior and medial to the left recurrent laryngeal nerve and near the left inferior thyroid pole identified a well perfused, normal appearing left inferior parathyroid gland. Both parathyroid glands appeared viable/well perfused, and were carefully preserved on their respective vascular pedicles, intact throughout the entire course of our dissection. The superior and medial aspect of the left thyroid lobe was dissected free of both identified branches of the left recurrent laryngeal nerve. Both branches of the left recurrent laryngeal nerve were seen terminating near the left cricothyroid joint, and both recurrent laryngeal nerve branches were carefully preserved. The Tubercle of Zukerkandl of the left thyroid lobe was identified and gently dissected free of both identified branches of the left recurrent laryngeal nerve. The attachments of the posterior left thyroid lobe to the trachea were divided taking care not to injure the clearly visualized terminal branches of the left recurrent laryngeal nerve.    The total thyroidectomy specimen was delivered, oriented for pathological processing (single stitch right superior thyroid pole, double stitch anterior isthmus) and submitted for permanent pathology analysis. The structural and functional integrity of the left recurrent laryngeal nerve, and the right recurrent laryngeal nerve was confirmed with the nerve stimulator ([employer] System), as a prominent audible signal was attained when both branches of the left and right recurrent laryngeal nerves were stimulated. A diligent search of the central neck and both lateral areas of the neck was unrevealing; specifically, there was no palpable adenopathy in either the right or left lateral (Level II, III, IV) neck, or the central compartment (Level VI and VII). Excellent hemostasis was confirmed along with two viable appearing left parathyroid glands. The 5 x 3 mm right inferior parathyroid gland was auto-transplanted into a 1 x 1 cm pocket within the distal anterior aspect of the right sternothyroid muscle. The autotransplantation site was marked with two interrupted 2-0 prolene sutures. Excellent hemostasis in the operative field was re-confirmed under Valsalva maneuver. Surgicel was placed in the operative field as a hemostatic adjunct. A 10 TLS drain was placed percutaneously into the operative field and secure to the skin with prolene suture. The strap musculature (sternothyroid and sternohyoid muscles) was re-approximated in the midline with running 3-0 Vicryl suture. The platysma muscle was reconstituted with running absorbable (3-0 Vicryl) suture   The wound was irrigated with saline solution. The skin incision was closed with running absorbable subcuticular suture (4-0 Monocryl). The TLS drain was placed to closed tube suction. This was an uncomplicated operation. The patient was extubated uneventfully in the operating room and transferred to the PACU in stable condition with aspiration precautions in effect   Complications: None   Implants: None  Disposition:  To PACU extubated and in stable condition with aspiration precautions in effect   Paris Lucio Jessica CORTESA FACS   Brigitte Garcia MD      Electronically Signed by Maryuri Duff MD on 5/14/2021 at 6:32 PM

## 2021-05-14 NOTE — ANESTHESIA POSTPROCEDURE EVALUATION
Procedure(s):  NECK EXPLORATION, TOTAL THYROIDECTOMY WITH NIM, AUTOTRANSPLANTATION RIGHT INFERIOR PARATHYROID GLAND TO RIGHT STERNOTHYROID MUSCLE. general    Anesthesia Post Evaluation        Patient location during evaluation: PACU  Note status: Adequate. Level of consciousness: responsive to verbal stimuli and sleepy but conscious  Pain management: satisfactory to patient  Airway patency: patent  Anesthetic complications: no  Cardiovascular status: acceptable  Respiratory status: acceptable  Hydration status: acceptable  Comments: +Post-Anesthesia Evaluation and Assessment    Patient: Bhanu Hansen MRN: [id number]  SSN: [id number]   YOB: 1980  Age: 35 y.o. Sex: female          Cardiovascular Function/Vital Signs    /79 (BP 1 Location: Right arm, BP Patient Position: At rest)   Pulse 74   Temp 36.4 °C (97.5 °F)   Resp 16   Ht 4' 11.5\" (1.511 m)   Wt 72 kg (158 lb 11.7 oz)   SpO2 96%   BMI 31.52 kg/m²     Patient is status post Procedure(s):  NECK EXPLORATION, TOTAL THYROIDECTOMY WITH NIM, AUTOTRANSPLANTATION RIGHT INFERIOR PARATHYROID GLAND TO RIGHT STERNOTHYROID MUSCLE. Nausea/Vomiting: Controlled. Postoperative hydration reviewed and adequate. Pain:  Pain Scale 1: FLACC (05/14/21 1211)  Pain Intensity 1: 0 (05/14/21 0835)   Managed. Neurological Status:   Neuro (WDL): (P) Within Defined Limits (05/14/21 1245)   At baseline. Mental Status and Level of Consciousness: Arousable. Pulmonary Status:   O2 Device: None (05/14/21 1216)   Adequate oxygenation and airway patent. Complications related to anesthesia: None    Post-anesthesia assessment completed. No concerns. I have evaluated the patient and the patient is stable and ready to be discharged from PACU .     Signed By: Annabel Li MD    5/14/2021        INITIAL Post-op Vital signs:   Vitals Value Taken Time   /79 05/14/21 1452   Temp 36.4 °C (97.5 °F) 05/14/21 1452   Pulse 74 05/14/21 1452 Resp 16 05/14/21 1452   SpO2 96 % 05/14/21 1452

## 2021-05-14 NOTE — PROGRESS NOTES
1423 TRANSFER - IN REPORT:    Verbal report received from [facility], RN (name) on Destiny Clifford  being received from [facility] (unit) for routine post - op      Report consisted of patients Situation, Background, Assessment and   Recommendations(SBAR). Information from the following report(s) SBAR was reviewed with the receiving nurse. Opportunity for questions and clarification was provided. Assessment completed upon patients arrival to unit and care assumed.

## 2021-05-15 VITALS
BODY MASS INDEX: 31.16 KG/M2 | HEIGHT: 60 IN | RESPIRATION RATE: 16 BRPM | SYSTOLIC BLOOD PRESSURE: 133 MMHG | WEIGHT: 158.73 LBS | HEART RATE: 75 BPM | TEMPERATURE: 98.5 F | DIASTOLIC BLOOD PRESSURE: 85 MMHG | OXYGEN SATURATION: 95 %

## 2021-05-15 LAB
CALCIUM SERPL-MCNC: 8.8 MG/DL (ref 8.5–10.1)
MAGNESIUM SERPL-MCNC: 2.1 MG/DL (ref 1.6–2.4)
PHOSPHATE SERPL-MCNC: 3.2 MG/DL (ref 2.6–4.7)

## 2021-05-15 PROCEDURE — 36415 COLL VENOUS BLD VENIPUNCTURE: CPT

## 2021-05-15 PROCEDURE — 84100 ASSAY OF PHOSPHORUS: CPT

## 2021-05-15 PROCEDURE — 99218 HC RM OBSERVATION: CPT

## 2021-05-15 PROCEDURE — 83735 ASSAY OF MAGNESIUM: CPT

## 2021-05-15 PROCEDURE — 74011250637 HC RX REV CODE- 250/637: Performed by: OTOLARYNGOLOGY

## 2021-05-15 PROCEDURE — 82310 ASSAY OF CALCIUM: CPT

## 2021-05-15 RX ORDER — LEVOTHYROXINE SODIUM 125 UG/1
125 TABLET ORAL
Qty: 45 TAB | Refills: 3 | Status: SHIPPED | OUTPATIENT
Start: 2021-05-15 | End: 2021-06-16 | Stop reason: SDUPTHER

## 2021-05-15 RX ORDER — ONDANSETRON 8 MG/1
4-8 TABLET, ORALLY DISINTEGRATING ORAL
Qty: 15 TAB | Refills: 1 | Status: SHIPPED | OUTPATIENT
Start: 2021-05-15 | End: 2021-11-12

## 2021-05-15 RX ORDER — OXYCODONE AND ACETAMINOPHEN 5; 325 MG/1; MG/1
1 TABLET ORAL
Qty: 18 TAB | Refills: 0 | Status: SHIPPED | OUTPATIENT
Start: 2021-05-15 | End: 2021-05-18

## 2021-05-15 RX ORDER — FERROUS SULFATE, DRIED 160(50) MG
1 TABLET, EXTENDED RELEASE ORAL EVERY 8 HOURS
Qty: 60 TAB | Refills: 3 | Status: SHIPPED | OUTPATIENT
Start: 2021-05-15 | End: 2021-11-12

## 2021-05-15 RX ORDER — ACETAMINOPHEN 325 MG/1
650 TABLET ORAL
Qty: 50 TAB | Refills: 3 | Status: SHIPPED | OUTPATIENT
Start: 2021-05-15 | End: 2021-06-16

## 2021-05-15 RX ORDER — CALCITRIOL 0.25 UG/1
0.25 CAPSULE ORAL DAILY
Qty: 30 CAP | Refills: 2 | Status: SHIPPED | OUTPATIENT
Start: 2021-05-15 | End: 2021-11-12

## 2021-05-15 RX ADMIN — Medication 1 TABLET: at 05:42

## 2021-05-15 RX ADMIN — OXYCODONE HYDROCHLORIDE AND ACETAMINOPHEN 1 TABLET: 10; 325 TABLET ORAL at 03:48

## 2021-05-15 RX ADMIN — OXYCODONE HYDROCHLORIDE AND ACETAMINOPHEN 1 TABLET: 10; 325 TABLET ORAL at 08:58

## 2021-05-15 NOTE — DISCHARGE INSTRUCTIONS
Post Thyroidectomy Instructions    Follow up: with Dr. Erica Gray 2 weeks after surgery to have your steristrips removed. Shortly after surgery call [phone] to schedule this appointment.  Eat regular foods.  You may shower in 24 hours. Do not allow direct water pressure on your wound. If water trickles down while washing your hair, allow the wound to dry on its own.  Do not scrub or soak your wound for 2 weeks or 14 days.  No strenuous activity: for 14 days.  No moving more than 15 pounds: for 14 days. Then includes pulling, pushing, tugging, throwing.  There is generally not a lot of pain: with this surgery. Take your pain medication as needed. Most patients, if they do have pain, will have neck stiffness/discomfort. You may have numbness or tingling surrounding the area of your wound. Narcotics can cause constipation; use your Colace if this is the case.  Nausea and vomiting: from lingering effects of general anesthesia usually resolves by the following day. The narcotic pain medication can cause nausea and vomiting. They should be taken with food or fluids to minimize this. Medications that reduce nausea and vomiting can be prescribed by your physician.  Fever above 100.4, redness around wound, pus drainage from wound: call your doctor.  Bleeding: is uncommon (less than 1%). If it does occur your neck will develop a fullness. It is good to take a look at your neck shortly after surgery to see what a baseline appearance is. If there are changes to this, then call your provider. \    CALL [phone] for concerns - a doctor Is on call 24/7     Special Instructions for if you had both sides of your thyroid removed or the remainder of your thyroid removed:    o You may be on Calcium ([place]) - it is very important that you take this.   Dr. Erica Gray will start a taper at your follow up visit  o If you experience tingling in the hands or around your mouth, your calcium may be dropping, go to the emergency room immediately and tell them you had your thyroid removed.  o You will be started on a dose of thyroid hormone replacement if you did not have high levels of thyroid hormone prior to surgery. Take this every day.

## 2021-05-15 NOTE — PROGRESS NOTES
Patient interviewed and examined  In good spirits  Operative site pain under good control with VAS of 3-4  No voice problems - no dysphonia, dysarthria or hoarseness  Voiding without difficulty  Swallowing liquids and solids without problems  She reports uneventful night     Apyrexic and hemodynamically stable  Alert and oriented x 4/4  T 98.5, HR 77, RR 16, /70  SpO2 95% on Room Air  TLS Drain output total since surgery is 36 ml  Minimal serosanguinous fluid currently in the TLS drain tube   Operative site incision clean, dry, intact with steri strips securely in place  No physical signs of operative site hematoma  Voice sounds good and is at pre-operative baseline     Serum Ca/Mg/Phos 8.2/2.1/3.2    Patient informed of operative findings and procedure performed  Voiced understanding of information provided and asked appropriate questions  All of the patient's stated questions were answered to his voiced satisfaction     POD1 s/p neck exploration, laryngeal nerve monitoring, total thyroidectomy     Doing well  Stable serum Calcium  D/C TLS draim  Discharge criteria met  Anticipate discharge home this morning     Hue Zapata MD [facility]

## 2021-05-15 NOTE — PROGRESS NOTES
246 Bedside and Verbal shift change report given to Ainsley Craven RN (oncoming nurse) by Maxine Cardoso RN (offgoing nurse). Report included the following information SBAR, Kardex, Procedure Summary, Intake/Output, MAR, Accordion, Recent Results and Med Rec Status. 1200 Discharge instructions reviewed with patient and spouse, all questions answered.

## 2021-06-16 ENCOUNTER — VIRTUAL VISIT (OUTPATIENT)
Dept: ENDOCRINOLOGY | Age: 41
End: 2021-06-16
Payer: COMMERCIAL

## 2021-06-16 DIAGNOSIS — E89.0 POSTOPERATIVE HYPOTHYROIDISM: Primary | ICD-10-CM

## 2021-06-16 DIAGNOSIS — E55.9 VITAMIN D DEFICIENCY: ICD-10-CM

## 2021-06-16 DIAGNOSIS — E83.51 HYPOCALCEMIA: ICD-10-CM

## 2021-06-16 PROCEDURE — 99214 OFFICE O/P EST MOD 30 MIN: CPT | Performed by: INTERNAL MEDICINE

## 2021-06-16 RX ORDER — LEVOTHYROXINE SODIUM 125 UG/1
125 TABLET ORAL
Qty: 90 TABLET | Refills: 1 | Status: SHIPPED | OUTPATIENT
Start: 2021-06-16 | End: 2021-11-15 | Stop reason: SDUPTHER

## 2021-06-16 NOTE — PROGRESS NOTES
Chief Complaint   Patient presents with    Thyroid Problem     pcp and pharmacy verified            **THIS IS A VIRTUAL VISIT VIA A VIDEO ENCOUNTER. PATIENT AGREED TO HAVE THEIR CARE DELIVERED OVER VIDEO IN PLACE OF THEIR REGULARLY SCHEDULED OFFICE VISIT**      History of Present Illness: Jaqueline Neely is a 35 y.o. female here for follow up of post-op hypothyroidism  Pt was first diagnosed with hyperthyroidism around 2011. She notes that she has been on the [address] since that time. \"I was diagnosed by an endocrinologist, I don't recall who that was. I then started to go to my PCP who has been evaluating my TFTs and adjusting my MMI. Pt notes that I went to see an allergist and they referred her to Dr. Veronika Parada, who send her for a CT which showed the goiter and the orbitopathy. At our initial visit in March 2021 she was taking MMI 10mg Mon and Wed and 5mg T/T/F/S/S. She was scheduled to go for thyroidectomy on 3/17/21 and I recommended post-op LT4 of 112mcg. She did not go for the thyroidectomy till 5/14/21 because she had a pre-op PTof 11.3 and was referred to see Dr. Edgardo May of Hematology. The recommendation was to hold the [place] 2 days prior to surgery. The surgical pathology did not show any evidence of malignancy. Pt notes \"everything went well and almost immediatly I could swallow better\". She notes some numbness at the OP site, but overall she notes she has recovered well. Pt is currently taking LT4 125mcg daily. She takes this first thing in the morning with her vitamin D. She is taking a Calcium supplement BID and Calcitriol 0.25mcg daily. She denies issues of perioral numbness or cramping in her hands. She denies issues of CP, SOB, palpitations, tremors, heat or cold intolerance, diarrhea or constipation. She denies issues of dysphagia or dysphonia. She notes that her eyes have been still feeling tired.  \"I did feel like there has been some release of pressure after the surgery and my eyes do not feel as strained or protuberant. She is seeing Dr. Saulo Reina will be monitoring my pressure after the thyroidectomy\". Will request records from Dr. Ines Degroot. \"I will have to reschedule, but he has been keeping an eye out for my eyes. He has ordered an CT for my eyes. She notes that her weight has been stable since surgery. Current Outpatient Medications   Medication Sig    levothyroxine (SYNTHROID) 125 mcg tablet Take 1 Tablet by mouth Daily (before breakfast). TAKE ON AN EMPTY STOMACH    calcium-vitamin D (OS-MURALI +D3) 500 mg-200 unit per tablet Take 1 Tab by mouth every eight (8) hours. (Patient taking differently: Take 1 Tablet by mouth two (2) times a day.)    calcitRIOL (ROCALTROL) 0.25 mcg capsule Take 1 Cap by mouth daily.  ondansetron (ZOFRAN ODT) 8 mg disintegrating tablet Take 0.5-1 Tabs by mouth every eight (8) hours as needed for Nausea.  cholecalciferol, vitamin D3, (VITAMIN D3 PO) Take 5,000 Units by mouth daily. No current facility-administered medications for this visit. Allergies   Allergen Reactions    Grass Pollen-Perennial Rye, Standard Hives    Shellfish Derived Hives     Review of Systems:  - Cardiovascular: no chest pain  - Neurological: no tremors  - Integumentary: skin is normal    Physical Examination:  There were no vitals taken for this visit.   - GENERAL: NCAT, Appears well nourished   - EYES: + exophthalmos   - Ear/Nose/Throat: NCAT, no visible inflammation or masses OP site healing well  - CARDIOVASCULAR: no cyanosis, no visible JVD   - RESPIRATORY: respiratory effort normal without any distress or labored breathing   - MUSCULOSKELETAL: Normal ROM of neck and upper extremities observed   - SKIN: No rash on face   - NEUROLOGIC:  No facial asymmetry (Cranial nerve 7 motor function), No gaze palsy   - PSYCHIATRIC: Normal affect, Normal insight and judgement     Data Reviewed:   - none new for review    Assessment/Plan:   1) Hypothyroidism > Pt is clinically euthyroid on the LT4 125mcg daily. Will order TFTs and adjust her dose as needed. 2) Graves' Opthalmopathy > She is being followed by Dr. Brian Madden and she feels her eye pressure has improved since surgery. 3) Hypocalcemia > Will order PTH and renal panel    RTC 3 months    Copy sent to:  Marly De La Cruz and Violet

## 2021-07-01 LAB
25(OH)D3+25(OH)D2 SERPL-MCNC: 61.3 NG/ML (ref 30–100)
ALBUMIN SERPL-MCNC: 4.2 G/DL (ref 3.8–4.8)
BUN SERPL-MCNC: 14 MG/DL (ref 6–24)
BUN/CREAT SERPL: 16 (ref 9–23)
CALCIUM SERPL-MCNC: 9.3 MG/DL (ref 8.7–10.2)
CHLORIDE SERPL-SCNC: 102 MMOL/L (ref 96–106)
CO2 SERPL-SCNC: 24 MMOL/L (ref 20–29)
CREAT SERPL-MCNC: 0.85 MG/DL (ref 0.57–1)
GLUCOSE SERPL-MCNC: 95 MG/DL (ref 65–99)
PHOSPHATE SERPL-MCNC: 3.6 MG/DL (ref 3–4.3)
POTASSIUM SERPL-SCNC: 4.3 MMOL/L (ref 3.5–5.2)
PTH-INTACT SERPL-MCNC: 16 PG/ML (ref 15–65)
SODIUM SERPL-SCNC: 139 MMOL/L (ref 134–144)
T4 FREE SERPL-MCNC: 1.49 NG/DL (ref 0.82–1.77)
TSH SERPL DL<=0.005 MIU/L-ACNC: 0.2 UIU/ML (ref 0.45–4.5)

## 2021-11-12 ENCOUNTER — OFFICE VISIT (OUTPATIENT)
Dept: ENDOCRINOLOGY | Age: 41
End: 2021-11-12
Payer: COMMERCIAL

## 2021-11-12 VITALS
HEIGHT: 60 IN | SYSTOLIC BLOOD PRESSURE: 110 MMHG | HEART RATE: 77 BPM | DIASTOLIC BLOOD PRESSURE: 84 MMHG | BODY MASS INDEX: 31.61 KG/M2 | WEIGHT: 161 LBS

## 2021-11-12 DIAGNOSIS — E55.9 VITAMIN D DEFICIENCY: ICD-10-CM

## 2021-11-12 DIAGNOSIS — E89.0 POSTOPERATIVE HYPOTHYROIDISM: Primary | ICD-10-CM

## 2021-11-12 PROCEDURE — 99214 OFFICE O/P EST MOD 30 MIN: CPT | Performed by: INTERNAL MEDICINE

## 2021-11-12 NOTE — PROGRESS NOTES
Chief Complaint   Patient presents with    Thyroid Problem     pcp and pharmacy verified     History of Present Illness: Jessenia Gill is a 35 y.o. female here for follow up of post-op hypothyroidism  Pt was first diagnosed with hyperthyroidism around 2011. She notes that she has been on the [employer] [place] since that time. \"I was diagnosed by an endocrinologist, I don't recall who that was. I then started to go to my PCP who has been evaluating my TFTs and adjusting my MMI. Pt notes that I went to see an allergist and they referred her to Dr. Daisy Stockton, who send her for a CT which showed the goiter and the orbitopathy. At our initial visit in March 2021 she was taking MMI 10mg Mon and Wed and 5mg T/T/F/S/S. She was scheduled to go for thyroidectomy on 3/17/21 and I recommended post-op LT4 of 112mcg. She did not go for the thyroidectomy till 5/14/21 because she had a pre-op PTof 11.3 and was referred to see Dr. Oumar Up of Hematology. The recommendation was to hold the [employer] [facility] 2 days prior to surgery. The surgical pathology did not show any evidence of malignancy. Pt notes \"everything went well and almost immediatly I could swallow better\". She notes some numbness at the OP site, but overall she notes she has recovered well. Since our last visit in June 2021 her dose of LT4 has remained the same at 125mcg, but she has been weaned off the Ca and Calcitriol. Pt denies any recent illnesses, injuries or hospitalizations. Pt has received both COVID vaccinations ([place]). Pt is currently taking LT4 125mcg daily. She takes this first thing in the morning, by itself on an empty stomach. She is still taking Vitamin D and Zinc daily,  She is not taking Ca or Calctriol. She denies issues of perioral numbness or cramping in her hands. She denies issues of CP, SOB, palpitations, tremors, heat or cold intolerance, diarrhea or constipation. She denies issues of dysphagia or dysphonia.   She denies any eye pains or irration. She notes that her eyes have been still feeling tired. \"I did feel like there has been some release of pressure after the surgery and my eyes do not feel as strained or protuberant. She is seeing Dr. Nicci White \"I will be seeing him later this year. I saw my regular eye doctor in July of this year. \"      She notes that her weight has been stable since surgery. Current Outpatient Medications   Medication Sig    OTHER Zinc 50 mg daily    levothyroxine (SYNTHROID) 125 mcg tablet Take 1 Tablet by mouth Daily (before breakfast). TAKE ON AN EMPTY STOMACH    cholecalciferol, vitamin D3, (VITAMIN D3 PO) Take 5,000 Units by mouth daily. No current facility-administered medications for this visit. Allergies   Allergen Reactions    Grass Pollen-Perennial Rye, Standard Hives    Shellfish Derived Hives     Review of Systems:  - Cardiovascular: no chest pain  - Neurological: no tremors  - Integumentary: skin is normal    Physical Examination:  Blood pressure 110/84, pulse 77, height 4' 11.5\" (1.511 m), weight 161 lb (73 kg). - General: pleasant, no distress, good eye contact, + exomphalos    - Neck: no thyromegaly or thyroid bruits  - Cardiovascular: regular, normal rate, nl s1 and s2, no m/r/g   - Integumentary: skin is normal, no edema  - Neurological: reflexes 2+ at biceps, no tremors  - Psychiatric: normal mood and affect    Data Reviewed:   - none new for review    Assessment/Plan:   1) Hypothyroidism > Pt is clinically euthyroid on the LT4 125mcg daily. Will order TFTs and adjust her dose as needed. Pt will need a 90 day refill once her labs are back. Since she is off the Ca and Calcitriol, I will order a renal panel and PTH.    2) Hypovitaminosis D > Pt to continue her Vitamin D, will order a Vitamin D level. Pt voices understanding and agreement with the plan.     RTC 6 months    Copy sent to:  Dr. Katie Villanueva

## 2021-11-12 NOTE — LETTER
11/12/2021    Patient: Anna Viveros   YOB: 1980   Date of Visit: 11/12/2021     Hunter Wang MD  [facility] [id number]  Via Fax: [phone]    Dear Hunter Wang MD,      Thank you for referring Ms. Anna Viveros to [facility] for evaluation. My notes for this consultation are attached. If you have questions, please do not hesitate to call me. I look forward to following your patient along with you.       Sincerely,    Randi Kraft MD

## 2021-11-15 RX ORDER — LEVOTHYROXINE SODIUM 125 UG/1
TABLET ORAL
Qty: 90 TABLET | Refills: 2 | Status: SHIPPED | OUTPATIENT
Start: 2021-11-15

## 2022-03-18 PROBLEM — E04.2 MULTINODULAR GOITER: Status: ACTIVE | Noted: 2021-05-14

## 2022-03-18 PROBLEM — I10 ESSENTIAL HYPERTENSION: Status: ACTIVE | Noted: 2021-04-16

## 2022-03-19 PROBLEM — E03.9 HYPOTHYROIDISM: Status: ACTIVE | Noted: 2021-04-16

## 2022-03-20 PROBLEM — R58 BLEEDING: Status: ACTIVE | Noted: 2021-04-16

## (undated) DEVICE — SPONGE: SPECIALTY PEANUT XR 100/CS: Brand: MEDICAL ACTION INDUSTRIES

## (undated) DEVICE — SYR 10ML LUER LOK 1/5ML GRAD --

## (undated) DEVICE — PACK,EENT,TURBAN DRAPE,PK II: Brand: MEDLINE

## (undated) DEVICE — APPLIER CLP M L11IN TI MULT RNG HNDL 30 CLP STR LIGACLP

## (undated) DEVICE — AGENT HEMSTAT W2XL3IN OXIDIZED REGENERATED CELOS ABSRB

## (undated) DEVICE — GLOVE,SURG,SENSICARE,ALOE,LF,PF,7: Brand: MEDLINE

## (undated) DEVICE — 10 FRENCH DRAIN SYSTEM, STERILE: Brand: TLS

## (undated) DEVICE — SPONGE GZ W4XL4IN COT RADPQ HIGHLY ABSRB

## (undated) DEVICE — SUTURE VCRL SZ 3-0 L27IN ABSRB UD L26MM SH 1/2 CIR J416H

## (undated) DEVICE — SMOKE EVACUATION PENCIL: Brand: VALLEYLAB

## (undated) DEVICE — HOOK RETRCT L5MM E SHRP SELF RET SYS LONE STAR

## (undated) DEVICE — Device

## (undated) DEVICE — SHEAR RMFG HARMONIC FOCUS 9CM -- OEM ITEM L#322125

## (undated) DEVICE — SURGICAL PROCEDURE PACK BASIN MAJ SET CUST NO CAUT

## (undated) DEVICE — SUTURE MCRYL SZ 4-0 L27IN ABSRB UD L19MM PS-2 1/2 CIR PRIM Y426H

## (undated) DEVICE — REM POLYHESIVE ADULT PATIENT RETURN ELECTRODE: Brand: VALLEYLAB

## (undated) DEVICE — STRIP,CLOSURE,WOUND,MEDI-STRIP,1/2X4: Brand: MEDLINE

## (undated) DEVICE — MAGNETIC INSTR DRAPE 20X16: Brand: MEDLINE INDUSTRIES, INC.

## (undated) DEVICE — BIPOLAR FORCEPS CORD: Brand: VALLEYLAB

## (undated) DEVICE — PROBE 8225101 5PK STD PRASS FL TIP ROHS

## (undated) DEVICE — INFECTION CONTROL KIT SYS

## (undated) DEVICE — SUT SLK 2-0SH 30IN BLK --

## (undated) DEVICE — SOL IRR SOD CL 0.9% 1000ML BTL --

## (undated) DEVICE — HANDLE LT SNAP ON ULT DURABLE LENS FOR TRUMPF ALC DISPOSABLE

## (undated) DEVICE — SUTURE ETHLN SZ 2-0 L18IN NONABSORBABLE BLK L26MM FS 3/8 664G

## (undated) DEVICE — GOWN,SIRUS,FABRNF,XL,20/CS: Brand: MEDLINE

## (undated) DEVICE — INSULATED BLADE ELECTRODE: Brand: EDGE

## (undated) DEVICE — MASTISOL ADHESIVE LIQ 2/3ML

## (undated) DEVICE — PREP SKN CHLRAPRP APL 26ML STR --

## (undated) DEVICE — NEEDLE HYPO 25GA L1.5IN BLU POLYPR HUB S STL REG BVL STR

## (undated) DEVICE — TOWEL SURG W17XL27IN STD BLU COT NONFENESTRATED PREWASHED